# Patient Record
Sex: MALE | Race: WHITE | Employment: FULL TIME | URBAN - METROPOLITAN AREA
[De-identification: names, ages, dates, MRNs, and addresses within clinical notes are randomized per-mention and may not be internally consistent; named-entity substitution may affect disease eponyms.]

---

## 2018-02-27 ENCOUNTER — OFFICE VISIT (OUTPATIENT)
Dept: ORTHOPEDIC SURGERY | Age: 48
End: 2018-02-27

## 2018-02-27 VITALS
HEART RATE: 77 BPM | WEIGHT: 228 LBS | SYSTOLIC BLOOD PRESSURE: 148 MMHG | BODY MASS INDEX: 32.64 KG/M2 | DIASTOLIC BLOOD PRESSURE: 96 MMHG | HEIGHT: 70 IN

## 2018-02-27 DIAGNOSIS — M25.562 LEFT KNEE PAIN, UNSPECIFIED CHRONICITY: Primary | ICD-10-CM

## 2018-02-27 DIAGNOSIS — M22.42 CHONDROMALACIA PATELLAE OF LEFT KNEE: ICD-10-CM

## 2018-02-27 DIAGNOSIS — M65.9 SYNOVITIS OF LEFT KNEE: ICD-10-CM

## 2018-02-27 PROBLEM — M65.962 SYNOVITIS OF LEFT KNEE: Status: ACTIVE | Noted: 2018-02-27

## 2018-02-27 PROCEDURE — 99203 OFFICE O/P NEW LOW 30 MIN: CPT | Performed by: FAMILY MEDICINE

## 2018-02-27 RX ORDER — DICLOFENAC SODIUM 75 MG/1
75 TABLET, DELAYED RELEASE ORAL 2 TIMES DAILY
Qty: 60 TABLET | Refills: 3 | Status: SHIPPED | OUTPATIENT
Start: 2018-02-27 | End: 2020-09-24 | Stop reason: SDUPTHER

## 2018-02-27 NOTE — PROGRESS NOTES
painless range of motion with regards to flexion and extension. Strength is 5/5 thorough out all planes. Ligamentous stability is grossly intact. Examination of the bilateral hip reveals intact skin. The patient demonstrates full painless range of motion with regards to flexion, abduction, internal and external rotation. There is not tenderness about the greater trochanter. There is a negative straight leg raise against resistance. Strength is 5/5 thorough out all planes. Right Lower Extremity: Examination of the right lower extremity does not show any tenderness, deformity or injury. Range of motion is unremarkable. There is no gross instability. There are no rashes, ulcerations or lesions. Strength and tone are normal.  Left Lower Extremity: Examination of the left lower extremity does not show any tenderness, deformity or injury. Range of motion is unremarkable. There is no gross instability. There are no rashes, ulcerations or lesions. Strength and tone are normal.      Diagnostic Test Findings: Left knee AP and PA weight-bearing sunrise and lateral films are obtained today and they show mild medial compartment narrowing and mild patellofemoral arthropathy but no high-grade degenerative changes or obvious loose bodies identified. Assessment :  #1.  2 days status post testing injury with aggravation low-grade left posterior arthritis or chondromalacia with possible occult meniscus and the synovitis with effusion    Impression:  Encounter Diagnoses   Name Primary?     Left knee pain, unspecified chronicity Yes    Synovitis of left knee     Chondromalacia patellae of left knee        Office Procedures:  Orders Placed This Encounter   Procedures    XR KNEE LEFT (MIN 4 VIEWS)    MRI Knee Left WO Contrast     Scheduling Instructions:      [facility] [place] [address]      [phone]            AUTH #:      TIME AND DATE TBD PLEASE CALL PATIENT ONCE APPROVED TO SCHEDULE             Remember that it may take several business days to pre-cert your MRI through your insurance. Our office will contact you as soon as we have the approval.             We will not give any test results over the phone. Please call [phone] once you have your test day and time to schedule a follow up with Dr. Rehan Hicks. Order Specific Question:   Reason for exam:     Answer:   r/o medial meniscus tear. Evaluate OA/CMP       Treatment Plan:  Treatment options were discussed with Manuela Ellison. We did review his plain films and exam findings. He does have a little mild underlying knee degenerative changes per plain films. I'm concerned about his mechanism of injury and an occult medial meniscus tear. 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 We did start him on diclofenac 75 mg 1 pill twice daily. Hold off on steroid injections bracing and imaging pending the results of his MRI and clinic and see him back in the office later this week. Relative rest ice and elevation was also recommended. I will see him back post imaging. This dictation was performed with a verbal recognition program (DRAGON) and it was checked for errors. It is possible that there are still dictated errors within this office note. If so, please bring any errors to my attention for an addendum. All efforts were made to ensure that this office note is accurate.

## 2018-03-06 ENCOUNTER — OFFICE VISIT (OUTPATIENT)
Dept: ORTHOPEDIC SURGERY | Age: 48
End: 2018-03-06

## 2018-03-06 VITALS
DIASTOLIC BLOOD PRESSURE: 99 MMHG | HEART RATE: 75 BPM | SYSTOLIC BLOOD PRESSURE: 153 MMHG | HEIGHT: 70 IN | WEIGHT: 227.96 LBS | BODY MASS INDEX: 32.63 KG/M2

## 2018-03-06 DIAGNOSIS — S83.242A ACUTE MEDIAL MENISCUS TEAR OF LEFT KNEE, INITIAL ENCOUNTER: ICD-10-CM

## 2018-03-06 DIAGNOSIS — M17.12 PRIMARY OSTEOARTHRITIS OF LEFT KNEE: ICD-10-CM

## 2018-03-06 DIAGNOSIS — M25.562 ACUTE PAIN OF LEFT KNEE: ICD-10-CM

## 2018-03-06 DIAGNOSIS — M22.42 CHONDROMALACIA PATELLAE OF LEFT KNEE: Primary | ICD-10-CM

## 2018-03-06 PROCEDURE — MISCD110 LATERAL STABILIZER: Performed by: FAMILY MEDICINE

## 2018-03-06 PROCEDURE — 99213 OFFICE O/P EST LOW 20 MIN: CPT | Performed by: FAMILY MEDICINE

## 2018-03-06 NOTE — PROGRESS NOTES
Chief Complaint  Knee Pain (TR MRI LEFT KNEE)      Follow-up left knee pain with underlying medial compartment arthritis and mild patellofemoral arthropathy with possible occult meniscus review of imaging    History of Present Illness:  Alfredo Paige is a 49 y.o. 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 Arti Stevenson who is being seen today for evaluation of persistent medial left knee pain. He states that over the past 4-8 weeks he has had some episodic soreness to the anterior medial portion of his left knee which she attributed to wrapping more than 40 minutes All games in the past couple of months. His symptoms came to a head on 2/25/2018 after roughing 11 games this past weekend on the 2nd game on 2/25 he did twist awkwardly and sustained an injury to his left knee. He does not really recall a pop or crack but did have pain and difficulty with attempting to run and pivot. Following his games, he went to dinner and was sitting for about an hour and is a consult. More substantial 3-5 out of 10 pain medially about the knee. He has had difficulty with distance walking positional changes walking out of the surfaces and pivoting. Squatting is also painful. His symptoms appear to be more medial in nature although he has had some anterior knee soreness on the right. He is not really complaining of true locking or catching or pseudo-buckling. He did take 2 doses of Aleve and ice and has been using a home brace. He is not complaining of true locking or catching. At best his symptoms have improved only 15% over the last couple of days and he is being seen today for orthopedic and sports consultation with imaging. He was seen initially in the office on 2/27/2018 started on conservative treatment for his underlying left knee medial compartment osteoarthritis with patellofemoral arthropathy and occult meniscus.   He medial meniscus is not complaining of mechanical symptoms. Symmetrically is 85-90% improved. He would like to avoid arthroscopic intervention presently since umpiring starts later this week. He will continue with his diclofenac 75 mg 1 pill placed daily was started him on a home-based exercise program.  Given his improvement we held off on steroid injections for the time being. He may utilize his patellar stabilizing brace. Potential for physical supplementation was also discussed and we'll consider this in the next couple of weeks depending on his symptomatology. He will contact us in the interim with questions and concerns of icing and activity modification was discussed. This dictation was performed with a verbal recognition program (DRAGON) and it was checked for errors. It is possible that there are still dictated errors within this office note. If so, please bring any errors to my attention for an addendum. All efforts were made to ensure that this office note is accurate.

## 2018-03-09 ENCOUNTER — TELEPHONE (OUTPATIENT)
Dept: ORTHOPEDIC SURGERY | Age: 48
End: 2018-03-09

## 2018-04-10 ENCOUNTER — OFFICE VISIT (OUTPATIENT)
Dept: ORTHOPEDIC SURGERY | Age: 48
End: 2018-04-10

## 2018-04-10 VITALS — HEIGHT: 70 IN | WEIGHT: 227.96 LBS | BODY MASS INDEX: 32.63 KG/M2

## 2018-04-10 DIAGNOSIS — M22.42 CHONDROMALACIA PATELLAE OF LEFT KNEE: ICD-10-CM

## 2018-04-10 DIAGNOSIS — M25.562 ACUTE PAIN OF LEFT KNEE: ICD-10-CM

## 2018-04-10 DIAGNOSIS — M17.12 PRIMARY OSTEOARTHRITIS OF LEFT KNEE: Primary | ICD-10-CM

## 2018-04-10 PROCEDURE — 20610 DRAIN/INJ JOINT/BURSA W/O US: CPT | Performed by: FAMILY MEDICINE

## 2018-04-10 PROCEDURE — 99213 OFFICE O/P EST LOW 20 MIN: CPT | Performed by: FAMILY MEDICINE

## 2018-04-17 ENCOUNTER — OFFICE VISIT (OUTPATIENT)
Dept: ORTHOPEDIC SURGERY | Age: 48
End: 2018-04-17

## 2018-04-17 VITALS — WEIGHT: 227.96 LBS | BODY MASS INDEX: 32.63 KG/M2 | HEIGHT: 70 IN

## 2018-04-17 DIAGNOSIS — M25.562 ACUTE PAIN OF LEFT KNEE: ICD-10-CM

## 2018-04-17 DIAGNOSIS — M22.42 CHONDROMALACIA PATELLAE OF LEFT KNEE: ICD-10-CM

## 2018-04-17 DIAGNOSIS — M17.12 PRIMARY OSTEOARTHRITIS OF LEFT KNEE: Primary | ICD-10-CM

## 2018-04-17 PROCEDURE — 20610 DRAIN/INJ JOINT/BURSA W/O US: CPT | Performed by: FAMILY MEDICINE

## 2018-04-26 ENCOUNTER — OFFICE VISIT (OUTPATIENT)
Dept: ORTHOPEDIC SURGERY | Age: 48
End: 2018-04-26

## 2018-04-26 VITALS
HEART RATE: 97 BPM | DIASTOLIC BLOOD PRESSURE: 90 MMHG | WEIGHT: 227 LBS | SYSTOLIC BLOOD PRESSURE: 149 MMHG | HEIGHT: 70 IN | BODY MASS INDEX: 32.5 KG/M2

## 2018-04-26 DIAGNOSIS — S83.242A ACUTE MEDIAL MENISCUS TEAR OF LEFT KNEE, INITIAL ENCOUNTER: ICD-10-CM

## 2018-04-26 DIAGNOSIS — M22.42 CHONDROMALACIA PATELLAE OF LEFT KNEE: ICD-10-CM

## 2018-04-26 DIAGNOSIS — M25.562 ACUTE PAIN OF LEFT KNEE: ICD-10-CM

## 2018-04-26 DIAGNOSIS — M17.12 PRIMARY OSTEOARTHRITIS OF LEFT KNEE: Primary | ICD-10-CM

## 2018-04-26 PROCEDURE — 20610 DRAIN/INJ JOINT/BURSA W/O US: CPT | Performed by: FAMILY MEDICINE

## 2018-06-26 ENCOUNTER — OFFICE VISIT (OUTPATIENT)
Dept: ORTHOPEDIC SURGERY | Age: 48
End: 2018-06-26

## 2018-06-26 VITALS — HEIGHT: 70 IN | WEIGHT: 227 LBS | BODY MASS INDEX: 32.5 KG/M2

## 2018-06-26 DIAGNOSIS — M17.12 PRIMARY OSTEOARTHRITIS OF LEFT KNEE: ICD-10-CM

## 2018-06-26 DIAGNOSIS — M22.42 CHONDROMALACIA PATELLAE OF LEFT KNEE: Primary | ICD-10-CM

## 2018-06-26 DIAGNOSIS — M25.562 ACUTE PAIN OF LEFT KNEE: ICD-10-CM

## 2018-06-26 PROCEDURE — 99213 OFFICE O/P EST LOW 20 MIN: CPT | Performed by: FAMILY MEDICINE

## 2018-12-13 ENCOUNTER — OFFICE VISIT (OUTPATIENT)
Dept: ORTHOPEDIC SURGERY | Age: 48
End: 2018-12-13
Payer: COMMERCIAL

## 2018-12-13 VITALS — WEIGHT: 230 LBS | HEIGHT: 71 IN | BODY MASS INDEX: 32.2 KG/M2

## 2018-12-13 DIAGNOSIS — M54.5 LOW BACK PAIN, UNSPECIFIED BACK PAIN LATERALITY, UNSPECIFIED CHRONICITY, WITH SCIATICA PRESENCE UNSPECIFIED: Primary | ICD-10-CM

## 2018-12-13 DIAGNOSIS — M51.36 DDD (DEGENERATIVE DISC DISEASE), LUMBAR: ICD-10-CM

## 2018-12-13 DIAGNOSIS — M54.17 LEFT LUMBOSACRAL RADICULOPATHY: ICD-10-CM

## 2018-12-13 DIAGNOSIS — M43.17 SPONDYLOLISTHESIS OF LUMBOSACRAL REGION: ICD-10-CM

## 2018-12-13 PROBLEM — M54.50 LOW BACK PAIN: Status: ACTIVE | Noted: 2018-12-13

## 2018-12-13 PROBLEM — M51.369 DDD (DEGENERATIVE DISC DISEASE), LUMBAR: Status: ACTIVE | Noted: 2018-12-13

## 2018-12-13 PROCEDURE — L0625 LO FLEX L1-BELOW L5 PRE OTS: HCPCS | Performed by: FAMILY MEDICINE

## 2018-12-13 PROCEDURE — 99214 OFFICE O/P EST MOD 30 MIN: CPT | Performed by: FAMILY MEDICINE

## 2018-12-13 RX ORDER — CYCLOBENZAPRINE HCL 10 MG
10 TABLET ORAL 3 TIMES DAILY PRN
Qty: 30 TABLET | Refills: 0 | Status: SHIPPED | OUTPATIENT
Start: 2018-12-13 | End: 2018-12-23

## 2018-12-13 RX ORDER — METHYLPREDNISOLONE 4 MG/1
TABLET ORAL
Qty: 21 KIT | Refills: 0 | Status: SHIPPED | OUTPATIENT
Start: 2018-12-13 | End: 2018-12-27

## 2018-12-13 RX ORDER — DICLOFENAC SODIUM 75 MG/1
75 TABLET, DELAYED RELEASE ORAL 2 TIMES DAILY
Qty: 60 TABLET | Refills: 3 | Status: SHIPPED | OUTPATIENT
Start: 2018-12-13 | End: 2018-12-27 | Stop reason: SDUPTHER

## 2018-12-13 NOTE — PROGRESS NOTES
about the greater trochanter. There is a negative straight leg raise against resistance. Strength is 5/5 thorough out all planes. Right Lower Extremity: Examination of the right lower extremity does not show any tenderness, deformity or injury. Range of motion is unremarkable. There is no gross instability. There are no rashes, ulcerations or lesions. Strength and tone are normal.  Left Lower Extremity: Examination of the left lower extremity does not show any tenderness, deformity or injury. Range of motion is unremarkable. There is no gross instability. There are no rashes, ulcerations or lesions. Strength and tone are normal.      Diagnostic Test Findings:  AP and lateral lumbar spine films were obtained today do show multilevel degenerative disc changes with mild endplate spurring. He does appear to have some facet arthropathy and a grade 1 spondylolisthesis at L5-S1      Assessment:  #1.  2-3 weeks status post symptomatic aggravation mechanical low back pain with lumbar degenerative disc disease and grade 1 L5-S1 spondylolisthesis with likely left S1 radiculopathy    Impression:  Encounter Diagnoses   Name Primary?  Low back pain, unspecified back pain laterality, unspecified chronicity, with sciatica presence unspecified Yes    Spondylolisthesis of lumbosacral region     DDD (degenerative disc disease), lumbar     Left lumbosacral radiculopathy        Office Procedures:  Orders Placed This Encounter   Procedures    XR LUMBAR SPINE (2-3 VIEWS)     Order Specific Question:   Reason for exam:     Answer:   pain    MRI Lumbar Spine WO Contrast     Scheduling Instructions:      [facility] [place] [address]      [phone]            AUTH #:      TIME AND DATE TBD      PLEASE CALL PATIENT ONCE APPROVED TO SCHEDULE             Remember that it may take several business days to pre-cert your MRI through your insurance.  Our office will

## 2018-12-14 ENCOUNTER — TELEPHONE (OUTPATIENT)
Dept: ORTHOPEDIC SURGERY | Age: 48
End: 2018-12-14

## 2018-12-27 ENCOUNTER — OFFICE VISIT (OUTPATIENT)
Dept: ORTHOPEDIC SURGERY | Age: 48
End: 2018-12-27
Payer: COMMERCIAL

## 2018-12-27 VITALS
HEIGHT: 71 IN | DIASTOLIC BLOOD PRESSURE: 97 MMHG | WEIGHT: 229.94 LBS | HEART RATE: 83 BPM | BODY MASS INDEX: 32.19 KG/M2 | SYSTOLIC BLOOD PRESSURE: 131 MMHG

## 2018-12-27 DIAGNOSIS — M54.16 LUMBAR RADICULOPATHY: ICD-10-CM

## 2018-12-27 DIAGNOSIS — M51.26 HERNIATED NUCLEUS PULPOSUS, LUMBAR: Primary | ICD-10-CM

## 2018-12-27 DIAGNOSIS — M43.17 SPONDYLOLISTHESIS OF LUMBOSACRAL REGION: ICD-10-CM

## 2018-12-27 PROCEDURE — 99213 OFFICE O/P EST LOW 20 MIN: CPT | Performed by: FAMILY MEDICINE

## 2018-12-27 NOTE — PROGRESS NOTES
Chief Complaint  Back Pain (F/u lumbar MRI)      Follow-up left lower back gluteal and leg pain with lumbar spondylolisthesis and probable left lumbar radiculopathy. Review of lumbar imaging    History of Present Illness:  Evita Mancilla is a 51 y.o. 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 Ananda Johnston who is being seen today for evaluation of persistent symptomatic left lower back gluteal and leg pain. He states it he's been having pain symptoms since the end of November 2018 which started insidiously in his lower back and has involved his gluteal region and posteriorly his left thigh in the S1 distribution. He is also having some numbness and tingling into his left foot. 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 He does complain of a dog cramping achy pain at 3-4-10 to have more sharp 5-6 out of 10 pain in his gluteal region and posterior thigh with standing and walking and positional change. Sitting is also problematic for him. He does have some episodic pain at night. He has had 5 sessions of chiropractic care out in [place] with Dr. Stefano Chapman was ineffective and did get some temporary benefit from traction but his symptoms never really went away. He has been taking Aleve and started back on his anti-inflammatories which we had him on earlier this year. He denies neurogenic bowel or bladder symptoms. He does have history of mechanical back pain but is never had formal MRI imaging. He has not had dedicated rehab and is being seen today for orthopedic and sports consultation with imaging. He was seen initially in the office on 12/13/2018 and was sent for an MRI given his ongoing pain and exam findings.   His MRI is obtained [facility] on 19 2018 and did show evidence of multilevel disc Examination  Inspection:  There is no high-grade deformity although he is very stiff. There is no palpable spasm. Palpation:  He does have Much less clinical tenderness over lower lumbar facets and paraspinals left Side with now only minimal central gluteal discomfort. No visual tuberosity tenderness. Rang of Motion:  He is only able to forward flex about 50-60 and has much less painful extension with less left sided gluteal discomfort with extension to the left. Lateral bending rotation diminished at least 30 %. Strength: There does not appear to be any focal motor deficits. Special Tests:  He does appear to have a mallet negative straight leg raise on the left. Negative logroll testing. IT band and hamstrings are very tight currently. Negative hip impingement testing. Negative Morales Charles testing. Skin: There are no rashes, ulcerations or lesions. Distal motor sensory and vascular exams intact. Gait: Fluid smooth gait    Reflexes:  Symmetrically preserved     Additional Comments:     Additional Examinations:  Contralateral Exam: Examination of the right hip reveals intact skin. The patient demonstrates full painless range of motion with regards to flexion, abduction, internal and external rotation. There is not tenderness about the greater trochanter. There is a negative straight leg raise against resistance. Strength is 5/5 thorough out all planes. Right Lower Extremity: Examination of the right lower extremity does not show any tenderness, deformity or injury. Range of motion is unremarkable. There is no gross instability. There are no rashes, ulcerations or lesions. Strength and tone are normal.  Left Lower Extremity: Examination of the left lower extremity does not show any tenderness, deformity or injury. Range of motion is unremarkable. There is no gross instability. There are no rashes, ulcerations or lesions.   Strength and tone are

## 2019-01-03 ENCOUNTER — HOSPITAL ENCOUNTER (OUTPATIENT)
Dept: PHYSICAL THERAPY | Age: 49
Setting detail: THERAPIES SERIES
Discharge: HOME | End: 2019-01-03
Payer: COMMERCIAL

## 2019-01-03 PROCEDURE — G8979 MOBILITY GOAL STATUS: HCPCS

## 2019-01-03 PROCEDURE — 97161 PT EVAL LOW COMPLEX 20 MIN: CPT

## 2019-01-03 PROCEDURE — G8978 MOBILITY CURRENT STATUS: HCPCS

## 2019-01-03 PROCEDURE — 97110 THERAPEUTIC EXERCISES: CPT

## 2019-01-03 ASSESSMENT — PAIN DESCRIPTION - LOCATION: LOCATION: BACK

## 2019-01-03 ASSESSMENT — PAIN DESCRIPTION - ORIENTATION: ORIENTATION: LOWER

## 2019-01-03 ASSESSMENT — PAIN DESCRIPTION - PROGRESSION: CLINICAL_PROGRESSION: GRADUALLY IMPROVING

## 2019-01-03 ASSESSMENT — PAIN DESCRIPTION - DESCRIPTORS: DESCRIPTORS: BURNING;SHOOTING

## 2019-01-08 ENCOUNTER — HOSPITAL ENCOUNTER (OUTPATIENT)
Dept: PHYSICAL THERAPY | Age: 49
Setting detail: THERAPIES SERIES
Discharge: HOME | End: 2019-01-08
Payer: COMMERCIAL

## 2019-01-08 PROCEDURE — 97110 THERAPEUTIC EXERCISES: CPT

## 2019-01-09 ENCOUNTER — APPOINTMENT (OUTPATIENT)
Dept: PHYSICAL THERAPY | Age: 49
End: 2019-01-09
Payer: COMMERCIAL

## 2019-01-15 ENCOUNTER — APPOINTMENT (OUTPATIENT)
Dept: PHYSICAL THERAPY | Age: 49
End: 2019-01-15
Payer: COMMERCIAL

## 2019-01-17 ENCOUNTER — HOSPITAL ENCOUNTER (OUTPATIENT)
Dept: PHYSICAL THERAPY | Age: 49
Setting detail: THERAPIES SERIES
Discharge: HOME | End: 2019-01-17
Payer: COMMERCIAL

## 2019-01-17 PROCEDURE — 97110 THERAPEUTIC EXERCISES: CPT

## 2019-01-17 ASSESSMENT — PAIN SCALES - QUEBEC BACK PAIN DISABILITY SCALE
QUEBEC CMS MODIFIER: CI
RUN ONE BLOCK OR 100M: 0
CARRY TWO BAGS OF GROCERIES: 0
MOVE A CHAIR: 0
GET OUT OF BED: 0
PULL OR PUSH HEAVY DOORS: 0
BEND OVER TO CLEAN THE BATHTUB: 0
REACH UP TO HIGH SHELVES: 0
LIFT AND CARRY A HEAVY SUITCASE: 0
WALK A FEW BLOCKS OR 300 TO 400M: 0
PUT ON SOCKS OR PANYHOSE: 0
TURN OVER IN BED: 0
MAKE YOUR BED: 0
STAND UP FOR 20 TO 30 MINUTES: 0
CLIMB ONE FLIGHT OF STAIRS: 0
QUEBEC DISABILITY INDEX: 1-19%
SIT IN A CHAIR FOR SEVERAL HOURS: 1
WALK SEVERAL KILOMETERS  OR MILES: 0
SLEEP THROUGH THE NIGHT: 0
THROW A BALL: 0
TAKE FOOD OUT OF THE REFRIGERATOR: 0
RIDE IN A CAR: 1
TOTAL SCORE: 2

## 2019-01-22 ENCOUNTER — APPOINTMENT (OUTPATIENT)
Dept: PHYSICAL THERAPY | Age: 49
End: 2019-01-22
Payer: COMMERCIAL

## 2019-01-24 ENCOUNTER — OFFICE VISIT (OUTPATIENT)
Dept: ORTHOPEDIC SURGERY | Age: 49
End: 2019-01-24
Payer: COMMERCIAL

## 2019-01-24 ENCOUNTER — APPOINTMENT (OUTPATIENT)
Dept: PHYSICAL THERAPY | Age: 49
End: 2019-01-24
Payer: COMMERCIAL

## 2019-01-24 VITALS
HEART RATE: 86 BPM | SYSTOLIC BLOOD PRESSURE: 163 MMHG | DIASTOLIC BLOOD PRESSURE: 108 MMHG | WEIGHT: 235 LBS | BODY MASS INDEX: 33.64 KG/M2 | HEIGHT: 70 IN

## 2019-01-24 DIAGNOSIS — M54.5 LOW BACK PAIN, UNSPECIFIED BACK PAIN LATERALITY, UNSPECIFIED CHRONICITY, WITH SCIATICA PRESENCE UNSPECIFIED: ICD-10-CM

## 2019-01-24 DIAGNOSIS — M22.42 CHONDROMALACIA PATELLAE OF LEFT KNEE: ICD-10-CM

## 2019-01-24 DIAGNOSIS — M51.36 DDD (DEGENERATIVE DISC DISEASE), LUMBAR: ICD-10-CM

## 2019-01-24 DIAGNOSIS — M43.17 SPONDYLOLISTHESIS OF LUMBOSACRAL REGION: ICD-10-CM

## 2019-01-24 DIAGNOSIS — M54.16 LUMBAR RADICULOPATHY: ICD-10-CM

## 2019-01-24 DIAGNOSIS — M17.12 PRIMARY OSTEOARTHRITIS OF LEFT KNEE: ICD-10-CM

## 2019-01-24 DIAGNOSIS — M51.26 HERNIATED NUCLEUS PULPOSUS, LUMBAR: Primary | ICD-10-CM

## 2019-01-24 PROCEDURE — 99213 OFFICE O/P EST LOW 20 MIN: CPT | Performed by: FAMILY MEDICINE

## 2019-01-29 ENCOUNTER — APPOINTMENT (OUTPATIENT)
Dept: PHYSICAL THERAPY | Age: 49
End: 2019-01-29
Payer: COMMERCIAL

## 2019-03-12 ENCOUNTER — OFFICE VISIT (OUTPATIENT)
Dept: ORTHOPEDIC SURGERY | Age: 49
End: 2019-03-12
Payer: COMMERCIAL

## 2019-03-12 VITALS — BODY MASS INDEX: 33.64 KG/M2 | HEIGHT: 70 IN | WEIGHT: 235.01 LBS

## 2019-03-12 DIAGNOSIS — M21.42 PES PLANUS OF BOTH FEET: ICD-10-CM

## 2019-03-12 DIAGNOSIS — M79.671 RIGHT FOOT PAIN: ICD-10-CM

## 2019-03-12 DIAGNOSIS — M72.2 PLANTAR FASCIITIS OF RIGHT FOOT: Primary | ICD-10-CM

## 2019-03-12 DIAGNOSIS — M21.41 PES PLANUS OF BOTH FEET: ICD-10-CM

## 2019-03-12 PROCEDURE — 99214 OFFICE O/P EST MOD 30 MIN: CPT | Performed by: FAMILY MEDICINE

## 2019-03-12 PROCEDURE — L3040 FT ARCH SUPRT PREMOLD LONGIT: HCPCS | Performed by: FAMILY MEDICINE

## 2019-03-12 RX ORDER — DICLOFENAC SODIUM 75 MG/1
75 TABLET, DELAYED RELEASE ORAL 2 TIMES DAILY
Qty: 60 TABLET | Refills: 3 | Status: SHIPPED | OUTPATIENT
Start: 2019-03-12 | End: 2020-09-24 | Stop reason: SDUPTHER

## 2019-03-12 RX ORDER — METHYLPREDNISOLONE 4 MG/1
TABLET ORAL
Qty: 21 KIT | Refills: 0 | Status: SHIPPED | OUTPATIENT
Start: 2019-03-12 | End: 2019-04-11 | Stop reason: ALTCHOICE

## 2019-04-11 ENCOUNTER — OFFICE VISIT (OUTPATIENT)
Dept: ORTHOPEDIC SURGERY | Age: 49
End: 2019-04-11
Payer: COMMERCIAL

## 2019-04-11 VITALS
HEIGHT: 70 IN | SYSTOLIC BLOOD PRESSURE: 143 MMHG | HEART RATE: 89 BPM | DIASTOLIC BLOOD PRESSURE: 91 MMHG | BODY MASS INDEX: 32.21 KG/M2 | WEIGHT: 225 LBS

## 2019-04-11 DIAGNOSIS — M79.671 RIGHT FOOT PAIN: Primary | ICD-10-CM

## 2019-04-11 DIAGNOSIS — M21.42 PES PLANUS OF BOTH FEET: ICD-10-CM

## 2019-04-11 DIAGNOSIS — M72.2 PLANTAR FASCIITIS OF RIGHT FOOT: ICD-10-CM

## 2019-04-11 DIAGNOSIS — M21.41 PES PLANUS OF BOTH FEET: ICD-10-CM

## 2019-04-11 PROBLEM — M21.40 FLAT FOOT: Status: ACTIVE | Noted: 2019-04-11

## 2019-04-11 PROCEDURE — 99213 OFFICE O/P EST LOW 20 MIN: CPT | Performed by: FAMILY MEDICINE

## 2019-04-11 NOTE — PROGRESS NOTES
Chief Complaint  Foot Pain (Right heel)    Follow-up right foot pain with plantar fasciitis and inflexibility with history of mechanical low back pain and disc herniation with resolved left L5 radiculopathy      History of Present Illness:  Caryle Deer is a 51 y.o. 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 Kathy Holland whom we have seen in the past for his knees and was treated several months ago for a lumbar disc herniation with radiculopathy which is improved who is being seen today upon self-referral for evaluation of a new condition to his right heel. He states that about 4 weeks ago when he was refereeing basketball, he had 2 games and went into overtime and as he was running down the court he felt soreness to the plantar portion of his heel and midfoot on his right foot. There is no actual trauma or injury prior to becoming symptomatic. He did have tightness and stiffness. He did initially attempt to stretch and ice and has gotten some off-the-shelf orthotics but continues to have difficulty with substantial morning awakening as well as distance walking. He localizes the majority of the pain over the midportion of the plantar fascia and also over the medial calcaneal tubercle. He is having difficulty getting up from a seated position after a prolonged period and initiation of gait. He has been taking low doses of over-the-counter ibuprofen and has gotten some off-the-shelf inserts. His pain does come in 3-7 out of 10 limiting his ability to walk and exercise as well as referee. 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 Denies numbness tingling or further radicular symptoms. Knees are doing reasonably well this time. He is being seen today for orthopedic and sports consultation with imaging.     He was initially seen in the office on 3/12/2019 tablet diagnosed with right plantar fasciitis with inflexibility and planus. He is in the process of breaking in his orthotics and start him on a Medrol Dosepak and encouraged to continue with his diclofenac 75 mg 1 pill twice daily. He presents back today stating that he is doing much better. Symmetrically he is 80% improved and is gotten substantial benefit from his off-the-shelf inserts. He is very busy at work but also with umpiring most nights per week as we are in the midst of baseball season and he has not yet gotten into physical therapy but does want to pursue this. He has been diligently working on his stretching. He is having much less discomfort at night and maximal pain is only about a 3 out of 10 and is having less pain with positional changes after sitting. He is pleased with this initial progress. Medical History  Patient's medications, allergies, past medical, surgical, social and family histories were reviewed and updated as appropriate. Review of Systems  Pertinent items are noted in HPI  Review of systems reviewed from Patient History Form dated on 12/13/2018 and available in the patient's chart under the Media tab. Vital Signs  Vitals:    04/11/19 1411   BP: (!) 143/91   Pulse: 89       General Exam:     Constitutional: Patient is adequately groomed with no evidence of malnutrition  DTRs: Deep tendon reflexes are intact  Mental Status: The patient is oriented to time, place and person. The patient's mood and affect are appropriate. Lymphatic: The lymphatic examination bilaterally reveals all areas to be without enlargement or induration. Vascular: Examination reveals no swelling or calf tenderness. Peripheral pulses are palpable and 2+. Neurological: The patient has good coordination. There is no weakness or sensory deficit. Right foot examination  Inspection:  There is no high-grade deformity although he is still fairly stiff. There is no palpable defects to the plantar fascia.       Palpation:  He does have less prominent clinical tenderness over the midsubstance of the plantar fascia and also the medial calcaneal tubercle. Rang of Motion:  He is still somewhat tight regard to his plantar fascia and Achilles tendon. Strength: There does not appear to be any focal motor deficits. Strength testing about the ankle mid and forefoot is intact. Special Tests:  Negative special testing. Negative [place]'s testing. Skin: There are no rashes, ulcerations or lesions. Distal motor sensory and vascular exams intact. Gait: Less pain with walking and positional change. He is an overpronator. Reflexes:  Symmetrically preserved     Additional Comments:     Additional Examinations:  Contralateral Exam: Contralateral left foot exam is benign. Right Lower Extremity: Examination of the right lower extremity does not show any tenderness, deformity or injury. Range of motion is unremarkable. There is no gross instability. There are no rashes, ulcerations or lesions. Strength and tone are normal.  Left Lower Extremity: Examination of the left lower extremity does not show any tenderness, deformity or injury. Range of motion is unremarkable. There is no gross instability. There are no rashes, ulcerations or lesions. Strength and tone are normal.      Diagnostic Test Findings:  Right foot AP lateral and oblique films are obtained today and do show inferior calcaneal spurring without evidence of acute fracture. Assessment:  #1.  8 weeks status post substantially improved right foot and heel pain with suspected plantar fasciitis with inflexibility. #2.  Pes planus with pronation. #3.  History of markedly improved aggravation mechanical low back pain with MRI documented multilevel lumbar degenerative disc disease with L5-S1 disc herniation and about and left L5 nerve root with facet arthropathy and resolved left L5 radiculopathy    Impression:  Encounter Diagnoses   Name Primary?     Right foot pain Yes    Plantar fasciitis of right foot     Pes planus of both feet        Office Procedures:  No orders of the defined types were placed in this encounter. Treatment Plan:  Treatment options were discussed with Caryle Deer. We did once again review his plain films and exam findings. Symmetrically he is feeling better would rate his improvement about 80% overlap month. He is having much less pain awakening in the morning and after prolonged sitting. I strongly encouraged him to attend at least a few sessions of physical therapy as I do believe he would benefit from [place] and instruction on proper stretching program.  He will continue with his off-the-shelf inserts that I would recommend continuing with his diclofenac 75 mg 1 twice daily. I think he will improve as he gets in the therapy that we can see him back as needed. Icing and activity modification cross friction massage was recommended. See him back as needed. He was encouraged to contact us with questions or concerns. This dictation was performed with a verbal recognition program (DRAGON) and it was checked for errors. It is possible that there are still dictated errors within this office note. If so, please bring any errors to my attention for an addendum. All efforts were made to ensure that this office note is accurate.

## 2019-04-22 ENCOUNTER — HOSPITAL ENCOUNTER (OUTPATIENT)
Dept: PHYSICAL THERAPY | Age: 49
Setting detail: THERAPIES SERIES
Discharge: HOME | End: 2019-04-22
Payer: COMMERCIAL

## 2019-04-22 PROCEDURE — 97110 THERAPEUTIC EXERCISES: CPT

## 2019-04-22 PROCEDURE — 97035 APP MDLTY 1+ULTRASOUND EA 15: CPT

## 2019-04-22 PROCEDURE — 97161 PT EVAL LOW COMPLEX 20 MIN: CPT

## 2019-04-22 PROCEDURE — 97140 MANUAL THERAPY 1/> REGIONS: CPT

## 2019-04-22 NOTE — PROGRESS NOTES
Outpatient Physical Therapy  [] [facility]    Phone: [phone]   Fax: [phone]   [] [facility]  Phone: [phone]              Fax: [phone]  [x] [facility]   Phone: [phone]   Fax: [phone]     To: Referring Practitioner: Dr. Alphonsa Curling      Patient: Manuel Draper   : 1970   MRN: [id number]  Evaluation Date: 2019      Diagnosis Information:  · Diagnosis: R foot pain (M79.671); plantar fasciitis of R foot (M72.2); Pes planus of both feet (M21.41, M21.42)   · Treatment Diagnosis: Decreased functional mobility     Physical Therapy Certification/Re-Certification Form  Dear Dr. Jd Emmanuel,  The following patient has been evaluated for physical therapy services and for therapy to continue, Medicare requires monthly physician review of the treatment plan. Please review the attached evaluation and/or summary of the patient's plan of care, and verify that you agree therapy should continue by signing the attached document and sending it back to our office. Plan of Care/Treatment to date:  [x] Therapeutic Exercise    [x] Modalities:  [] Therapeutic Activity     [] Ultrasound  [] Electrical Stimulation  [] Gait Training      [] Cervical Traction [] Lumbar Traction  [] Neuromuscular Re-education    [] Cold/hotpack [] Iontophoresis   [x] Instruction in HEP     Other:  [x] Manual Therapy      []             [] Aquatic Therapy      []           ? Frequency/Duration:  # Days per week: [x] 1 day # Weeks: [] 1 week [] 5 weeks     [x] 2 days? [] 2 weeks [] 6 weeks     [] 3 days   [] 3 weeks [] 7 weeks     [] 4 days   [x] 4 weeks [] 8 weeks    Rehab Potential: [] Excellent [] Good [] Fair  [] Poor       Electronically signed by:  Adilia Rivera PT      If you have any questions or concerns, please don't hesitate to call.   Thank you for your referral.      Physician Signature:________________________________Date:__________________  By signing above, therapists plan is approved by physician

## 2019-04-22 NOTE — FLOWSHEET NOTE
Physical Therapy Daily Treatment Note  Date:  2019    Patient Name:  Anais Isaacs    :  1970  MRN: [id number]  Restrictions/Precautions:    Pertinent Medical History:  Medical/Treatment Diagnosis Information:  · Diagnosis: R foot pain (M79.671); plantar fasciitis of R foot (M72.2); Pes planus of both feet (M21.41, M21.42)  · Treatment Diagnosis: Decreased functional mobility  Insurance/Certification information:  PT Insurance Information: [employer]as[employer]  Physician Information:  Referring Practitioner: Dr. Celi Ruggiero of care signed (Y/N):  Sent to in basket on 19  Visit# / total visits:    Pain level: /10     Progress Note: []  Yes  []  No  Next due by: Visit #10      History of Injury:Pt stated he started to have R foot pain on 19. Pt had on/off pain since 19. 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 Pt saw Dr. Corinne Srinivasan on 3/10/19. Pt was given a steroid pack and antiinflammatory and instructed to begin PT. Pt started PT late because \"baseball season started. \"  Pt rated R foot pain 0-3/10, but could spike after an activity (officiating) up to 5/10. Pt was instructed in a couple stretches previously. At home pt has pain with grass cutting or weed eating and stair climbing. 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 Pt wears \"heavier shoes\" as \"the whole shoe is just a big piece of armour- they're not spikes. \"   Pt has been wearing inserts for his \"flat arches\" which helps with pain. Pt has early morning foot stiffness. Subjective:       Objective:  Observation:   Test measurements:  19  Posture: L LE ER, otherwise WFL. Gait: L LE ER. Stair climbing: L LE ER, otherwise WFL. Heel walk: (-); toe walk (-).     TTP: R plantar fascia  AROM RLE (degrees)  R Ankle Dorsiflexion 0-20: (Gastrocs) R 2, L 8.  R Ankle Forefoot Inversion 0-40: 40, L 35  R Ankle Forefoot

## 2019-04-24 ENCOUNTER — HOSPITAL ENCOUNTER (OUTPATIENT)
Dept: PHYSICAL THERAPY | Age: 49
Setting detail: THERAPIES SERIES
Discharge: HOME | End: 2019-04-24
Payer: COMMERCIAL

## 2019-04-24 NOTE — FLOWSHEET NOTE
Physical Therapy  Cancellation/No-show Note  Patient Name:  Sukhdev Fung  :  1970   Date:  2019  Cancelled visits to date: 1  No-shows to date: 0    For today's appointment patient:  [x]  Cancelled  []  Rescheduled appointment  []  No-show     Reason given by patient:  []  Patient ill  []  Conflicting appointment  []  No transportation    []  Conflict with work  []  No reason given  []  Other:     Comments:  Work related    Electronically signed by:  Luis Alfredo Barnett PT

## 2019-04-26 ENCOUNTER — HOSPITAL ENCOUNTER (OUTPATIENT)
Dept: PHYSICAL THERAPY | Age: 49
Setting detail: THERAPIES SERIES
Discharge: HOME | End: 2019-04-26
Payer: COMMERCIAL

## 2019-04-26 PROCEDURE — 97140 MANUAL THERAPY 1/> REGIONS: CPT

## 2019-04-26 PROCEDURE — 97035 APP MDLTY 1+ULTRASOUND EA 15: CPT

## 2019-04-26 PROCEDURE — 97110 THERAPEUTIC EXERCISES: CPT

## 2019-04-26 NOTE — FLOWSHEET NOTE
Physical Therapy Daily Treatment Note  Date:  2019    Patient Name:  Florian Velez    :  1970  MRN: [id number]  Restrictions/Precautions:    Pertinent Medical History:  Medical/Treatment Diagnosis Information:  · Diagnosis: R foot pain (M79.671); plantar fasciitis of R foot (M72.2); Pes planus of both feet (M21.41, M21.42)  · Treatment Diagnosis: Decreased functional mobility  Insurance/Certification information:  PT Insurance Information: [employer] Easley  Physician Information:  Referring Practitioner: Dr. Agustin Veliz of care signed (Y/N):  Sent to in basket on 19  Visit# / total visits:    Pain level: /10     Progress Note: []  Yes  []  No  Next due by: Visit #10      History of Injury:Pt stated he started to have R foot pain on 19. Pt had on/off pain since 19. 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 Pt saw Dr. Hilda Landa on 3/10/19. Pt was given a steroid pack and antiinflammatory and instructed to begin PT. Pt started PT late because \"baseball season started. \"  Pt rated R foot pain 0-3/10, but could spike after an activity (officiating) up to 5/10. Pt was instructed in a couple stretches previously. At home pt has pain with grass cutting or weed eating and stair climbing. 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 Pt wears \"heavier shoes\" as \"the whole shoe is just a big piece of armour- they're not spikes. \"   Pt has been wearing inserts for his \"flat arches\" which helps with pain. Pt has early morning foot stiffness. Subjective:       Objective:  Observation:   Test measurements:  19  Posture: L LE ER, otherwise WFL. Gait: L LE ER. Stair climbing: L LE ER, otherwise WFL. Heel walk: (-); toe walk (-).     TTP: R plantar fascia  AROM RLE (degrees)  R Ankle Dorsiflexion 0-20: (Gastrocs) R 2, L 8.  R Ankle Forefoot Inversion 0-40: 40, L 35  R Ankle Forefoot Eversion 0-20: R 12, L 27  Joint Mobility: ROM RLE: Calcaneus PROM INV R 20, L 25; EV R 2, L 8.  Strength RLE: WFL  Strength LLE: WFL      Exercises:  Exercise/Equipment Resistance/Repetitions Other comments   Gastroc incline stretch 2x30\"    Standing HS stretch 2x30\"    BAPS R LE  PF/DF  INV/EV  CW/CCW 20x each    BOSU- dome up  Unilateral balance   10\" hold, 10x                                                         Other Therapeutic Activities:      Home Exercise Program:    4/22/19 HEP was instructed and included belt gastroc stretch, manual plantar fascia stretch, gastroc step stretch; towel toe curls. Pt was given written hand outs and demonstrated exercises correctly. Pt expressed understanding of exercises.       Manual Treatments:  DTM R plantar fascia, R calcaneal INV mob, 20' total    Modalities:  US 1.5 w/cm2, 100%, 8'    Timed Code Treatment Minutes:  42    Total Treatment Minutes:  42    Assessment  [] Patient tolerated treatment well [] Patient limited by fatigue  [x] Patient limited by pain  [] Patient limited by other medical complications  [] Other:         Prognosis: [x] Good [] Fair  [] Poor    Patient Requires Follow-up: [x] Yes  [] No    Plan:   [x] Continue per plan of care [] Alter current plan (see comments)  [] Plan of care initiated [] Hold pending MD visit [] Discharge  Plan for Next Session:      Electronically signed by:  Missy Duncan PT

## 2019-04-30 ENCOUNTER — HOSPITAL ENCOUNTER (OUTPATIENT)
Dept: PHYSICAL THERAPY | Age: 49
Setting detail: THERAPIES SERIES
Discharge: HOME | End: 2019-04-30
Payer: COMMERCIAL

## 2019-04-30 PROCEDURE — 97035 APP MDLTY 1+ULTRASOUND EA 15: CPT

## 2019-04-30 PROCEDURE — 97140 MANUAL THERAPY 1/> REGIONS: CPT

## 2019-04-30 PROCEDURE — 97110 THERAPEUTIC EXERCISES: CPT

## 2019-04-30 NOTE — FLOWSHEET NOTE
Physical Therapy Daily Treatment Note  Date:  2019    Patient Name:  Florian Velez    :  1970  MRN: [id number]  Restrictions/Precautions:    Pertinent Medical History:  Medical/Treatment Diagnosis Information:  · Diagnosis: R foot pain (M79.671); plantar fasciitis of R foot (M72.2); Pes planus of both feet (M21.41, M21.42)  · Treatment Diagnosis: Decreased functional mobility  Insurance/Certification information:  PT Insurance Information: [employer]ill[employer] Easley  Physician Information:  Referring Practitioner: Dr. Agustin Veliz of care signed (Y/N):  Sent to in basket on 19  Visit# / total visits:  3/8  Pain level: /10     Progress Note: []  Yes  []  No  Next due by: Visit #10      History of Injury:Pt stated he started to have R foot pain on 19. Pt had on/off pain since 19. 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 Pt saw Dr. Hilda Landa on 3/10/19. Pt was given a steroid pack and antiinflammatory and instructed to begin PT. Pt started PT late because \"baseball season started. \"  Pt rated R foot pain 0-3/10, but could spike after an activity (officiating) up to 5/10. Pt was instructed in a couple stretches previously. At home pt has pain with grass cutting or weed eating and stair climbing. 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 Pt wears \"heavier shoes\" as \"the whole shoe is just a big piece of armour- they're not spikes. \"   Pt has been wearing inserts for his \"flat arches\" which helps with pain. Pt has early morning foot stiffness. 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 Gait: L LE ER. Stair climbing: L LE ER, otherwise WFL. Heel walk: (-); toe walk (-).     TTP: R plantar fascia  AROM RLE (degrees)  R Ankle Dorsiflexion 0-20: (Gastrocs) R 2, L 8.  R Ankle Forefoot Inversion 0-40: 40, L 35  R Ankle Forefoot Eversion 0-20: R 12, L 27  Joint Mobility: ROM RLE: Calcaneus PROM INV R 20, L 25; EV R 2, L 8.  Strength RLE: WFL  Strength LLE: WFL      Exercises:  Exercise/Equipment Resistance/Repetitions Other comments   Gastroc incline stretch 2x30\"    Standing HS stretch 2x30\"    BAPS R LE  PF/DF  INV/EV  CW/CCW 30x each- level II    BOSU- dome up  Unilateral balance   10\" hold, 10x    BOSU- flat up  Lateral rocking  AP rocking   30x  30x                                                    Other Therapeutic Activities:      Home Exercise Program:    4/22/19 HEP was instructed and included belt gastroc stretch, manual plantar fascia stretch, gastroc step stretch; towel toe curls. Pt was given written hand outs and demonstrated exercises correctly. Pt expressed understanding of exercises.       Manual Treatments:  DTM R plantar fascia, R calcaneal INV mob, TC mob AP, 20' total    Modalities:  US 1.5 w/cm2, 100%, 8'    Timed Code Treatment Minutes:  42    Total Treatment Minutes:  42    Assessment  [] Patient tolerated treatment well [] Patient limited by fatigue  [x] Patient limited by pain  [] Patient limited by other medical complications  [] Other:         Prognosis: [x] Good [] Fair  [] Poor    Patient Requires Follow-up: [x] Yes  [] No    Plan:   [x] Continue per plan of care [] Alter current plan (see comments)  [] Plan of care initiated [] Hold pending MD visit [] Discharge  Plan for Next Session:      Electronically signed by:  Bunny Alcala PT

## 2019-05-03 ENCOUNTER — HOSPITAL ENCOUNTER (OUTPATIENT)
Dept: PHYSICAL THERAPY | Age: 49
Setting detail: THERAPIES SERIES
Discharge: HOME | End: 2019-05-03
Payer: COMMERCIAL

## 2019-05-03 NOTE — FLOWSHEET NOTE
Physical Therapy  Cancellation/No-show Note  Patient Name:  Geoff De La Cruz  :  1970   Date:  5/3/2019  Cancelled visits to date: 1  No-shows to date: 0    For today's appointment patient:  [x]  Cancelled  []  Rescheduled appointment  []  No-show     Reason given by patient:  []  Patient ill  []  Conflicting appointment  []  No transportation    [x]  Conflict with work  []  No reason given  []  Other:     Comments:      Electronically signed by:  Roxy Damico PT

## 2019-05-07 ENCOUNTER — HOSPITAL ENCOUNTER (OUTPATIENT)
Dept: PHYSICAL THERAPY | Age: 49
Setting detail: THERAPIES SERIES
Discharge: HOME | End: 2019-05-07
Payer: COMMERCIAL

## 2019-05-07 PROCEDURE — 97035 APP MDLTY 1+ULTRASOUND EA 15: CPT

## 2019-05-07 PROCEDURE — 97140 MANUAL THERAPY 1/> REGIONS: CPT

## 2019-05-07 PROCEDURE — 97110 THERAPEUTIC EXERCISES: CPT

## 2019-05-07 NOTE — FLOWSHEET NOTE
Forefoot Inversion 0-40: 40, L 35  R Ankle Forefoot Eversion 0-20: R 12, L 27  Joint Mobility: ROM RLE: Calcaneus PROM INV R 20, L 25; EV R 2, L 8.  Strength RLE: WFL  Strength LLE: WFL      Exercises:  Exercise/Equipment Resistance/Repetitions Other comments   Gastroc incline stretch 2x30\"    Standing HS stretch 2x30\"    BAPS R LE  PF/DF  INV/EV  CW/CCW 30x each- level II    BOSU- dome up  Unilateral balance   10\" hold, 10x    BOSU- flat up  Lateral rocking  AP rocking   30x  30x                                                    Other Therapeutic Activities:      Home Exercise Program:    4/22/19 HEP was instructed and included belt gastroc stretch, manual plantar fascia stretch, gastroc step stretch; towel toe curls. Pt was given written hand outs and demonstrated exercises correctly. Pt expressed understanding of exercises.       Manual Treatments:  DTM R plantar fascia, R calcaneal INV mob, TC mob AP, 20' total    Modalities:  US 1.5 w/cm2, 100%, 8'    Timed Code Treatment Minutes:  42    Total Treatment Minutes:  42    Assessment  [] Patient tolerated treatment well [] Patient limited by fatigue  [x] Patient limited by pain  [] Patient limited by other medical complications  [] Other:         Prognosis: [x] Good [] Fair  [] Poor    Patient Requires Follow-up: [x] Yes  [] No    Plan:   [x] Continue per plan of care [] Alter current plan (see comments)  [] Plan of care initiated [] Hold pending MD visit [] Discharge  Plan for Next Session:      Electronically signed by:  Deon Lynne PT

## 2019-05-09 ENCOUNTER — HOSPITAL ENCOUNTER (OUTPATIENT)
Dept: PHYSICAL THERAPY | Age: 49
Setting detail: THERAPIES SERIES
Discharge: HOME | End: 2019-05-09
Payer: COMMERCIAL

## 2019-05-09 PROCEDURE — 97140 MANUAL THERAPY 1/> REGIONS: CPT

## 2019-05-09 PROCEDURE — 97110 THERAPEUTIC EXERCISES: CPT

## 2019-05-09 PROCEDURE — 97035 APP MDLTY 1+ULTRASOUND EA 15: CPT

## 2019-05-09 NOTE — FLOWSHEET NOTE
Physical Therapy Daily Treatment Note  Date:  2019    Patient Name:  Stiven Toro    :  1970  MRN: [id number]  Restrictions/Precautions:    Pertinent Medical History:  Medical/Treatment Diagnosis Information:  · Diagnosis: R foot pain (M79.671); plantar fasciitis of R foot (M72.2); Pes planus of both feet (M21.41, M21.42)  · Treatment Diagnosis: Decreased functional mobility  Insurance/Certification information:  PT Insurance Information: [employer]ill[employer] Easley  Physician Information:  Referring Practitioner: Dr. Kate Aguilar of care signed (Y/N):  Sent to in basket on 19  Visit# / total visits:    Pain level: /10     Progress Note: []  Yes  []  No  Next due by: Visit #10      History of Injury:Pt stated he started to have R foot pain on 19. Pt had on/off pain since 19. 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 Pt saw Dr. Sarah Gomez on 3/10/19. Pt was given a steroid pack and antiinflammatory and instructed to begin PT. Pt started PT late because \"baseball season started. \"  Pt rated R foot pain 0-3/10, but could spike after an activity (officiating) up to 5/10. Pt was instructed in a couple stretches previously. At home pt has pain with grass cutting or weed eating and stair climbing. 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 Pt wears \"heavier shoes\" as \"the whole shoe is just a big piece of armour- they're not spikes. \"   Pt has been wearing inserts for his \"flat arches\" which helps with pain. Pt has early morning foot stiffness. 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 Gait: L LE ER. Stair climbing: L LE ER, otherwise WFL. Heel walk: (-); toe walk (-).     TTP: R plantar fascia  AROM RLE (degrees)  R Ankle Dorsiflexion 0-20: (Gastrocs) R 2, L 8.  R Ankle Forefoot Inversion 0-40: 40, L 35  R Ankle Forefoot Eversion 0-20: R 12, L 27  Joint Mobility: ROM RLE: Calcaneus PROM INV R 20, L 25; EV R 2, L 8.  Strength RLE: WFL  Strength LLE: [facility]   5/9/19  Posture: L LE ER, otherwise WFL. TTP: R plantar fascia  AROM RLE (degrees)  R Ankle Dorsiflexion 0-20: (Gastrocs) R 8  R Ankle Forefoot Eversion 0-20: R 20  Joint Mobility: Calcaneus PROM INV R 22; EV R 12        Exercises:  Exercise/Equipment Resistance/Repetitions Other comments   Gastroc incline stretch 2x30\"    Standing HS stretch 2x30\"    BAPS R LE  PF/DF  INV/EV  CW/CCW 30x each- level II    BOSU- dome up  Unilateral balance   10\" hold, 10x    BOSU- flat up  Lateral rocking  AP rocking   30x  30x                                                    Other Therapeutic Activities:      Home Exercise Program:    4/22/19 HEP was instructed and included belt gastroc stretch, manual plantar fascia stretch, gastroc step stretch; towel toe curls. Pt was given written hand outs and demonstrated exercises correctly. Pt expressed understanding of exercises.       Manual Treatments:  DTM R plantar fascia, R calcaneal INV mob, TC mob AP, 20' total    Modalities:  US 1.5 w/cm2, 100%, 8'    Timed Code Treatment Minutes:  42    Total Treatment Minutes:  42    Assessment  [] Patient tolerated treatment well [] Patient limited by fatigue  [x] Patient limited by pain  [] Patient limited by other medical complications  [] Other:         Prognosis: [x] Good [] Fair  [] Poor    Patient Requires Follow-up: [x] Yes  [] No    Plan:   [x] Continue per plan of care [] Alter current plan (see comments)  [] Plan of care initiated [] Hold pending MD visit [] Discharge  Plan for Next Session:      Electronically signed by:  Ashwin Ruano PT

## 2019-05-14 ENCOUNTER — HOSPITAL ENCOUNTER (OUTPATIENT)
Dept: PHYSICAL THERAPY | Age: 49
Setting detail: THERAPIES SERIES
Discharge: HOME | End: 2019-05-14
Payer: COMMERCIAL

## 2019-05-14 NOTE — FLOWSHEET NOTE
Physical Therapy  Cancellation/No-show Note  Patient Name:  Rubi Jimenez  :  1970   Date:  2019  Cancelled visits to date: 2   No-shows to date: 0    For today's appointment patient:  [x]  Cancelled  []  Rescheduled appointment  []  No-show     Reason given by patient:  []  Patient ill  []  Conflicting appointment  []  No transportation    [x]  Conflict with work  []  No reason given  []  Other:     Comments:      Electronically signed by:  Andrews Chavez PT

## 2019-05-16 ENCOUNTER — APPOINTMENT (OUTPATIENT)
Dept: PHYSICAL THERAPY | Age: 49
End: 2019-05-16
Payer: COMMERCIAL

## 2020-09-21 ENCOUNTER — TELEPHONE (OUTPATIENT)
Dept: ORTHOPEDIC SURGERY | Age: 50
End: 2020-09-21

## 2020-09-21 ENCOUNTER — OFFICE VISIT (OUTPATIENT)
Dept: ORTHOPEDIC SURGERY | Age: 50
End: 2020-09-21
Payer: COMMERCIAL

## 2020-09-21 VITALS — HEIGHT: 70 IN | BODY MASS INDEX: 32.21 KG/M2 | WEIGHT: 225 LBS

## 2020-09-21 PROCEDURE — 99214 OFFICE O/P EST MOD 30 MIN: CPT | Performed by: FAMILY MEDICINE

## 2020-09-21 RX ORDER — METHYLPREDNISOLONE 4 MG/1
TABLET ORAL
Qty: 21 KIT | Refills: 0 | Status: SHIPPED | OUTPATIENT
Start: 2020-09-21 | End: 2020-10-28 | Stop reason: ALTCHOICE

## 2020-09-21 RX ORDER — DICLOFENAC SODIUM 75 MG/1
75 TABLET, DELAYED RELEASE ORAL 2 TIMES DAILY
Qty: 60 TABLET | Refills: 3 | Status: SHIPPED | OUTPATIENT
Start: 2020-09-21 | End: 2022-01-17

## 2020-09-21 NOTE — PROGRESS NOTES
Systems  Pertinent items are noted in HPI  Review of systems reviewed from Patient History Form dated on 9/21/2020 and available in the patient's chart under the Media tab. Vital Signs  There were no vitals filed for this visit. General Exam:     Constitutional: Patient is adequately groomed with no evidence of malnutrition  DTRs: Deep tendon reflexes are intact  Mental Status: The patient is oriented to time, place and person. The patient's mood and affect are appropriate. Lymphatic: The lymphatic examination bilaterally reveals all areas to be without enlargement or induration. Vascular: Examination reveals no swelling or calf tenderness. Peripheral pulses are palpable and 2+. Neurological: The patient has good coordination. There is no weakness or sensory deficit. Knee Examination  Inspection:  There is no high-grade deformity of the does have trac eknee joint effusion. No patellofemoral crepitation noted. Palpation:  He does have clinical tenderness mildly at 0-1 out of 10 with palpation of the medial joint line. He does have moderate tenderness along the lateral femoral condyle and over the anterior and central portion of the lateral joint line. Mild IT band tenderness. No substantial MCL tenderness proximally or distally. Rang of Motion:  He does have near full extension on the flexion beyond 110 is mildly painful to the posterior medial knee. Strength:  4+ out of 5 with knee flexion and extension. Some tightness of her hamstrings. Special Tests:  He does have mild pain with medial Dameon's. He does have tenderness along the anterior and central lateral joint line worsening with Dameon's which he rates at about a 7 out of 10. There is no high-grade clicks. Negative medial Dameon's and there is no laxity with varus or valgus stress. Lockman stable. Negative posterior drawer. Mild discomfort with patellar grind testing.      Skin: There are no rashes, ulcerations or lesions. Distal neurovascular exam is intact. Gait: Mild altalgia    Reflex symmetrically preserved    Additional Comments:     Additional Examinations:  Contralateral Exam: Examination of the right knee reveals intact skin. There is no focal tenderness. The patient demonstrates full painless range of motion with regards to flexion and extension. Strength is 5/5 thorough out all planes. Ligamentous stability is grossly intact. Examination of the bilateral hip reveals intact skin. The patient demonstrates full painless range of motion with regards to flexion, abduction, internal and external rotation. There is not tenderness about the greater trochanter. There is a negative straight leg raise against resistance. Strength is 5/5 thorough out all planes. Right Lower Extremity: Examination of the right lower extremity does not show any tenderness, deformity or injury. Range of motion is unremarkable. There is no gross instability. There are no rashes, ulcerations or lesions. Strength and tone are normal.  Left Lower Extremity: Examination of the left lower extremity does not show any tenderness, deformity or injury. Range of motion is unremarkable. There is no gross instability. There are no rashes, ulcerations or lesions. Strength and tone are normal.      Diagnostic Test Findings: Left knee AP and PA weight-bearing sunrise and lateral films are obtained today and they show mild medial compartment narrowing and mild patellofemoral arthropathy but no high-grade degenerative changes or obvious loose bodies identified. Assessment :  #1.  3 days status post weightbearing valgus injury left knee with known history of underlying mild osteoarthritis medial compartment with patellofemoral arthropathy with possible occult lateral meniscus/lateral femoral condylar bone bruise synovitis   Impression:  Encounter Diagnoses   Name Primary?     Left knee pain, unspecified chronicity Yes    Contusion of left knee, initial encounter     Primary osteoarthritis of left knee     Chondromalacia patellae of left knee        Office Procedures:  Orders Placed This Encounter   Procedures    XR KNEE LEFT (MIN 4 VIEWS)     Order Specific Question:   Reason for exam:     Answer:   LEFT KNEE PAIN    MRI KNEE LEFT WO CONTRAST     Standing Status:   Future     Standing Expiration Date:   12/21/2020     Scheduling Instructions:      [facility] [place] [address]      [phone]            AUTH #:      TIME AND DATE TBD      PLEASE CALL PATIENT ONCE APPROVED TO SCHEDULE       PUSH TO [employer]            Remember that it may take several business days to pre-cert your MRI through your insurance. Our office will contact you as soon as we have the approval. We will not give any test results over the phone. Please call [phone] once you have your test day and time to schedule a follow up with Dr. Merari Meredith. Order Specific Question:   Reason for exam:     Answer:   r/o lateral meniscus tear, lateral femoral condyle bruise, any mcl pathology, OA CMP       Treatment Plan:  Treatment options were discussed with Joan Colin. We did review his updated plain films and exam findings. He is now 3 days out from his valgus rotational injury to his left knee. As noted he does have underlying patellofemoral arthropathy with mild medial compartment osteoarthritis but most of his pain is over the lateral joint line at this point. I would like for him to have an MRI to rule out an occult lateral meniscus tear and or ligamentous injury. We did place him on a Medrol Dosepak to be followed by diclofenac 75 mg 1 pill twice daily and he will utilize his patellar stabilizing brace. Continue with his gentle motion and stretching program.  Icing and activity modification was discussed.   Hopefully we can get his MRI rather expeditiously and see him back later this week in the office. He will contact us in the interim with questions or concerns. This dictation was performed with a verbal recognition program (DRAGON) and it was checked for errors. It is possible that there are still dictated errors within this office note. If so, please bring any errors to my attention for an addendum. All efforts were made to ensure that this office note is accurate.

## 2020-09-21 NOTE — TELEPHONE ENCOUNTER
Patient is calling and would like to be seen for left knee injury during football game was tackled and took a helmet to the knee.   [phone]

## 2020-09-22 ENCOUNTER — TELEPHONE (OUTPATIENT)
Dept: ORTHOPEDIC SURGERY | Age: 50
End: 2020-09-22

## 2020-09-22 NOTE — TELEPHONE ENCOUNTER
Left voicemail for patient that their MRI has been authorized and that they can call and schedule scan at their convenience. Also told them that they can call and schedule a f/u with Dr. Megan Ward once they have MRI scheduled, leaving at least 2-3 days for our office to receive their results.

## 2020-09-24 ENCOUNTER — OFFICE VISIT (OUTPATIENT)
Dept: ORTHOPEDIC SURGERY | Age: 50
End: 2020-09-24
Payer: COMMERCIAL

## 2020-09-24 VITALS — WEIGHT: 225 LBS | HEIGHT: 70 IN | BODY MASS INDEX: 32.21 KG/M2

## 2020-09-24 PROCEDURE — 99213 OFFICE O/P EST LOW 20 MIN: CPT | Performed by: FAMILY MEDICINE

## 2020-09-24 PROCEDURE — L1830 KO IMMOB CANVAS LONG PRE OTS: HCPCS | Performed by: FAMILY MEDICINE

## 2020-09-24 NOTE — PROGRESS NOTES
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 Review of left knee imaging    History of Present Illness:  Matthew Simpson is a 45 y.o. 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 Deonte Perez who is being seen today for evaluation of a new acute injury to his left knee. We had seen him about 2-1/2 years ago for underlying left knee chondromalacia and mild underlying osteoarthritis and underwent a successful treatment with Visco supplementation. 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 There was no reports a pop or crack or though he did have immediate pain anteriorly and medially. There is no sense of shifting or pop. He did have pain immediately but opted to continue refereeing the game. He did not develop a great deal of swelling but was quite sore. He states that he is having an achy pain a 2-3 out of 10 but if he attempts to pivot suddenly or undergo a lateral motion will be quite substantial at 8 out of 10. Most of his pain is anterior lateral.  He has having a little medial pain. He states it feels different from his underlying arthritic changes. He may have had a few episodes of catching but does not have oliver locking. He has been taking his anti-inflammatories with diclofenac and has been icing. His symptoms have not improved over the last 3 days. He is being seen today for orthopedic and sports consultation with updated imaging. It was initially evaluated in the office on 9/21/2020 and is now 6 days out from his knee injury sustained while refereeing a football game.   He was quite tender laterally some medially and we did send him for an MRI of his knee. His MRI was obtained at [facility] on 9/23/2020 and did show an acute thankfully nondisplaced nondepressed fracture to the tibial plateau as well as his chronic trizonal complex tear to the posterior horn and body of the medial meniscus which may have propagated somewhat and is now 3 to 4 cm in nature. He did have underlying arthritic changes noted. Tissue swelling was also noted. He is feeling somewhat better since his initial evaluation after starting on his anti-inflammatories and has taken 2 days of the Medrol Dosepak. I sensations of loose bodies locking or catching. Medical History     Patient's medications, allergies, past medical, surgical, social and family histories were reviewed and updated as appropriate. Review of Systems  Pertinent items are noted in HPI  Review of systems reviewed from Patient History Form dated on 9/21/2020 and available in the patient's chart under the Media tab. Vital Signs  There were no vitals filed for this visit. General Exam:     Constitutional: Patient is adequately groomed with no evidence of malnutrition  DTRs: Deep tendon reflexes are intact  Mental Status: The patient is oriented to time, place and person. The patient's mood and affect are appropriate. Lymphatic: The lymphatic examination bilaterally reveals all areas to be without enlargement or induration. Vascular: Examination reveals no swelling or calf tenderness. Peripheral pulses are palpable and 2+. Neurological: The patient has good coordination. There is no weakness or sensory deficit. Knee Examination  Inspection:  There is no high-grade deformity of the does have trac eknee joint effusion. No patellofemoral crepitation noted. Palpation:  He does have clinical tenderness mildly at 0-1 out of 10 with palpation of the medial joint line.   He does have moderate tenderness along the lateral femoral condyle and lateral tibial plateau which he rates at 7-8 out of 10. Mild IT band tenderness. No substantial MCL tenderness proximally or distally. Rang of Motion:  He does have near full extension on the flexion beyond 110 is mildly painful to the posterior and lateral medial knee. Strength:  4+ out of 5 with knee flexion and extension. Some tightness of her hamstrings. Special Tests:  He does have mild pain with medial aDmeon's. He does have tenderness along the anterior and central lateral joint line worsening with Dameon's which he rates at about a 7 out of 10. There is no high-grade clicks. Negative medial Dameon's and there is no laxity with varus or valgus stress. Lockman stable. Negative posterior drawer. Mild discomfort with patellar grind testing. Skin: There are no rashes, ulcerations or lesions. Distal neurovascular exam is intact. Gait: Improved gait    Reflex symmetrically preserved    Additional Comments:     Additional Examinations:  Contralateral Exam: Examination of the right knee reveals intact skin. There is no focal tenderness. The patient demonstrates full painless range of motion with regards to flexion and extension. Strength is 5/5 thorough out all planes. Ligamentous stability is grossly intact. Examination of the bilateral hip reveals intact skin. The patient demonstrates full painless range of motion with regards to flexion, abduction, internal and external rotation. There is not tenderness about the greater trochanter. There is a negative straight leg raise against resistance. Strength is 5/5 thorough out all planes. Right Lower Extremity: Examination of the right lower extremity does not show any tenderness, deformity or injury. Range of motion is unremarkable. There is no gross instability. There are no rashes, ulcerations or lesions.   Strength and tone are normal.  Left Lower Extremity: Examination of the left lower extremity does not show any tenderness, deformity or injury. Range of motion is unremarkable. There is no gross instability. There are no rashes, ulcerations or lesions. Strength and tone are normal.      Diagnostic Test Findings: The MRI obtained at [facility] AT [facility] 2020 as listed above  Narrative    Site: [facility] Rad #: [id number] #: [id number] Procedure: MR Left Knee w/o Contrast ; Reason for Exam: rule out lateral meniscus tear, lateral femoral condyle bruise, MCL pathology, OA, CMP    This document is confidential medical information.  Unauthorized disclosure or use of this information is prohibited by law. If you are not the intended recipient of this document, please advise us by calling immediately [phone].         [address]              Patient Name: Gary Irizarry    Case ID: [id number]    Patient : 1970    Referring Physician: Comfort Mccray MD    Exam Date: 2020    Exam Description: MR Left Knee w/o Contrast              HISTORY:  Evaluate lateral meniscus tear, lateral femoral condyle bruise, MCL pathology, OA,    CMP.         TECHNICAL FACTORS:  Long- and short-axis fat- and water-weighted images were performed.         COMPARISON:  Prior left knee MRI 2018.         FINDINGS:  ACL, PCL, LCL and MCL are intact.         Edema within the lateral tibia and anterolateral tibia.  Contusion and subcortical fracture    present.  This is new relative to the prior study.         Anterolateral subcutis swelling.  Inflammation also noted anteriorly and medially.  Soft tissue     contusion and sprain present.         Thickened MCL.  Chronic sprain and scarring present.  LCL is intact.         3-4 cm complex trizonal pattern of tearing involves the posterior horn and body of the medial    meniscus.  This has propagated relative to the prior study.  Small parameniscal cyst posterior    horn-body junction measures 4 mm.  Grade 3 chondral irregularity involves the medial femur,    medial tibia, trochlear groove and patellar cartilage.         CONCLUSION:    1. Acute subcortical fracture with surrounding contusion anterolateral tibial plateau. 2. 3-4 cm trizonal tear posterior horn-body junction medial meniscus with foci of parameniscal    cyst measuring 4 mm.  This has propagated relative to the prior study. 3. Anterolateral and less so anteromedial subcutis soft tissue contusion.         Thank you for the opportunity to provide your interpretation.                   Carmella Waite MD         A: MATTHEW/dony 09/23/2020 1:41 PM    T: DONY 09/23/2020 12:41 PM          Assessment :  #1.  6 days status post weightbearing valgus injury left knee with known history of underlying mild osteoarthritis medial compartment with patellofemoral arthropathy with MRI documented nondisplaced/nondepressed lateral tibial plateau fracture   Impression:  Encounter Diagnoses   Name Primary?  Closed fracture of lateral portion of left tibial plateau, initial encounter Yes    Primary osteoarthritis of left knee     Old tear of medial meniscus of left knee, unspecified tear type     Contusion of left knee, initial encounter        Office Procedures:  Orders Placed This Encounter   Procedures    DJO Knee Immobilizer     Patient was prescribed a DJO Knee Immobilizer. The left knee will require stabilization / immobilization from this semi-rigid / rigid orthosis to improve their function. The orthosis will assist in protecting the affected area, provide functional support and facilitate healing. The prefabricated orthosis was modified in the following manner to provide a customizable fit for the patient at the time of delivery. 1. Identification of appropriate positioning and alignment of anatomical landmarks. 2. Trimming of straps and panels. Reassemble orthosis to specifically fit patient.      The patient was educated and fit by a healthcare professional with expert knowledge and specialization in brace application while under the direct supervision of the treating physician. Verbal and written instructions for the use of and application of this item were provided. They were instructed to contact the office immediately should the brace result in increased pain, decreased sensation, increased swelling or worsening of the condition. Treatment Plan:  Treatment options were discussed with Alondra Robert. We did review his updated plain films recent left knee MRI and exam findings. He is now 6 days out from his valgus rotational injury to his left knee. As noted he does have underlying patellofemoral arthropathy with mild medial compartment osteoarthritis but his MRI does show evidence of a nondepressed lateral tibial plateau fracture early present on his MRI. Very from his wraparound brace to a knee immobilizer which she will need for the next several weeks. He will stop his Medrol Dosepak but start his diclofenac 75 mg 1 pill twice daily. Icing was recommended. I like to see him back next week for AP lateral and oblique films x2 of his left knee. He will likely miss the next 6 weeks of football but does anticipate refereeing for basketball this winter as well. 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 He will contact us in the interim with questions or concerns. This dictation was performed with a verbal recognition program (DRAGON) and it was checked for errors. It is possible that there are still dictated errors within this office note. If so, please bring any errors to my attention for an addendum. All efforts were made to ensure that this office note is accurate.

## 2020-10-01 ENCOUNTER — OFFICE VISIT (OUTPATIENT)
Dept: ORTHOPEDIC SURGERY | Age: 50
End: 2020-10-01
Payer: COMMERCIAL

## 2020-10-01 VITALS — WEIGHT: 235 LBS | TEMPERATURE: 97.3 F | HEIGHT: 71 IN | BODY MASS INDEX: 32.9 KG/M2

## 2020-10-01 PROCEDURE — 99213 OFFICE O/P EST LOW 20 MIN: CPT | Performed by: FAMILY MEDICINE

## 2020-10-01 NOTE — PROGRESS NOTES
Chief Complaint  Follow-up (LT Knee)      Follow-up left knee pain with MRI documented lateral tibial plateau fracture with chronic trizonal complex tear posterior horn and body medial meniscus with underlying osteoarthritis. Patient here for x-ray check. History of Present Illness:  Gregg Flores is a 45 y.o. 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 Zamzam Keita who is being seen today for evaluation of a new acute injury to his left knee. We had seen him about 2-1/2 years ago for underlying left knee chondromalacia and mild underlying osteoarthritis and underwent a successful treatment with Visco supplementation. 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 There was no reports a pop or crack or though he did have immediate pain anteriorly and medially. There is no sense of shifting or pop. He did have pain immediately but opted to continue refereeing the game. He did not develop a great deal of swelling but was quite sore. He states that he is having an achy pain a 2-3 out of 10 but if he attempts to pivot suddenly or undergo a lateral motion will be quite substantial at 8 out of 10. Most of his pain is anterior lateral.  He has having a little medial pain. He states it feels different from his underlying arthritic changes. He may have had a few episodes of catching but does not have oliver locking. He has been taking his anti-inflammatories with diclofenac and has been icing. His symptoms have not improved over the last 3 days. He is being seen today for orthopedic and sports consultation with updated imaging.     It was initially evaluated in the office on 9/21/2020 and is now 6 days out from his knee injury sustained while refereeing a football game. He was quite tender laterally some medially and we did send him for an MRI of his knee. His MRI was obtained at [facility] on 9/23/2020 and did show an acute thankfully nondisplaced nondepressed fracture to the tibial plateau as well as his chronic trizonal complex tear to the posterior horn and body of the medial meniscus which may have propagated somewhat and is now 3 to 4 cm in nature. He did have underlying arthritic changes noted. Tissue swelling was also noted. He is feeling somewhat better since his initial evaluation after starting on his anti-inflammatories and has taken 2 days of the Medrol Dosepak. I sensations of loose bodies locking or catching. Last seen in the office on 9/24/2020 and is now 13 days out from his MRI documented lateral tibial plateau fracture to his left knee. He is doing much better is not having pain is been tolerating his knee immobilizer. His swelling is improved and he is continued with his anti-inflammatory medications. Denies locking catching or true instability. He is out of the remainder football season. 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 Would be able to hold off on repetitive ladder usage and climbing for the first few weeks he believes. Denies locking catching or true instability symptoms. Medical History     Patient's medications, allergies, past medical, surgical, social and family histories were reviewed and updated as appropriate. Review of Systems  Pertinent items are noted in HPI  Review of systems reviewed from Patient History Form dated on 9/21/2020 and available in the patient's chart under the Media tab. Vital Signs  Vitals:    10/01/20 1436   Temp: 97.3 °F (36.3 °C)       General Exam:     Constitutional: Patient is adequately groomed with no evidence of malnutrition  DTRs: Deep tendon reflexes are intact  Mental Status:  The patient is oriented to time, place and person. The patient's mood and affect are appropriate. Lymphatic: The lymphatic examination bilaterally reveals all areas to be without enlargement or induration. Vascular: Examination reveals no swelling or calf tenderness. Peripheral pulses are palpable and 2+. Neurological: The patient has good coordination. There is no weakness or sensory deficit. Knee Examination  Inspection:  There is no high-grade deformity of the does have trac eknee joint effusion. No patellofemoral crepitation noted. Palpation:  He no longer has clinical tenderness mildly at 0 out of 10 with palpation of the medial joint line. He does have mild tenderness along the lateral femoral condyle and lateral tibial plateau which he rates at 3 out of 10. Mild IT band tenderness. No substantial MCL tenderness proximally or distally. Rang of Motion:  He does have near full extension on the flexion beyond 115 is coupled for only mild soreness to the posterior and lateral medial knee. Strength:  4+ out of 5 with knee flexion and extension. Some tightness of her hamstrings. Special Tests:  He does have this tenderness along the anterior and central lateral joint line attempted Dameon's testing today. I do not sense high-grade instability only minimal discomfort with patellar grind testing. Skin: There are no rashes, ulcerations or lesions. Distal neurovascular exam is intact. Gait: Improved gait and is using his knee immobilizer. Reflex symmetrically preserved    Additional Comments:     Additional Examinations:  Contralateral Exam: Examination of the right knee reveals intact skin. There is no focal tenderness. The patient demonstrates full painless range of motion with regards to flexion and extension. Strength is 5/5 thorough out all planes. Ligamentous stability is grossly intact. Examination of the bilateral hip reveals intact skin.   The patient demonstrates full painless range of motion with regards to flexion, abduction, internal and external rotation. There is not tenderness about the greater trochanter. There is a negative straight leg raise against resistance. Strength is 5/5 thorough out all planes. Right Lower Extremity: Examination of the right lower extremity does not show any tenderness, deformity or injury. Range of motion is unremarkable. There is no gross instability. There are no rashes, ulcerations or lesions. Strength and tone are normal.  Left Lower Extremity: Examination of the left lower extremity does not show any tenderness, deformity or injury. Range of motion is unremarkable. There is no gross instability. There are no rashes, ulcerations or lesions. Strength and tone are normal.      Diagnostic Test Findings: The MRI obtained at [facility] 2020 as listed above  Narrative    Site: [facility] Rad #: [id number] #: [id number] Procedure: MR Left Knee w/o Contrast ; Reason for Exam: rule out lateral meniscus tear, lateral femoral condyle bruise, MCL pathology, OA, CMP    This document is confidential medical information.  Unauthorized disclosure or use of this information is prohibited by law. If you are not the intended recipient of this document, please advise us by calling immediately [phone].         [address]              Patient Name: Adam Perez    Case ID: [id number]    Patient : 1970    Referring Physician: Fara Gardiner MD    Exam Date: 2020    Exam Description: MR Left Knee w/o Contrast              HISTORY:  Evaluate lateral meniscus tear, lateral femoral condyle bruise, MCL pathology, OA,    CMP.         TECHNICAL FACTORS:  Long- and short-axis fat- and water-weighted images were performed.         COMPARISON:  Prior left knee MRI 2018.         FINDINGS:  ACL, PCL, LCL and MCL are intact.         Edema within the lateral tibia and anterolateral tibia.  Contusion and subcortical fracture    present.  This is new relative to the prior study.         Anterolateral subcutis swelling.  Inflammation also noted anteriorly and medially.  Soft tissue     contusion and sprain present.         Thickened MCL.  Chronic sprain and scarring present.  LCL is intact.         3-4 cm complex trizonal pattern of tearing involves the posterior horn and body of the medial    meniscus.  This has propagated relative to the prior study.  Small parameniscal cyst posterior    horn-body junction measures 4 mm.  Grade 3 chondral irregularity involves the medial femur,    medial tibia, trochlear groove and patellar cartilage.         CONCLUSION:    1. Acute subcortical fracture with surrounding contusion anterolateral tibial plateau. 2. 3-4 cm trizonal tear posterior horn-body junction medial meniscus with foci of parameniscal    cyst measuring 4 mm.  This has propagated relative to the prior study. 3. Anterolateral and less so anteromedial subcutis soft tissue contusion.         Thank you for the opportunity to provide your interpretation.                   Jane Ramirez MD         A: MATTHEW/dony 09/23/2020 1:41 PM    T: TK 09/23/2020 12:41 PM      Left knee AP lateral oblique films x2 does show stable appearance to his nondepressed nondisplaced lateral tibial plateau fracture. Assessment :  #1.  13 days status post weightbearing valgus injury left knee with known history of underlying mild osteoarthritis medial compartment with patellofemoral arthropathy with MRI documented nondisplaced/nondepressed lateral tibial plateau fracture   Impression:  Encounter Diagnosis   Name Primary?     Closed fracture of lateral portion of left tibial plateau, initial encounter Yes       Office Procedures:  Orders Placed This Encounter   Procedures    XR KNEE LEFT (MIN 4 VIEWS)     4V L Knee     Standing Status:   Future     Number of Occurrences:   1     Standing Expiration Date:   11/1/2020     Order Specific Question:   Reason for exam:     Answer:   Knee Pain       Treatment Plan:  Treatment options were discussed with Susan Houston. We did review his updated plain films recent left knee MRI and exam findings. He is now 13 days out from his valgus rotational injury to his left knee. As noted he does have underlying patellofemoral arthropathy with mild medial compartment osteoarthritis but his MRI does show evidence of a nondepressed lateral tibial plateau fracture early present on his MRI. Like for him to continue his knee immobilizer for the next couple of weeks how though he was previously given a wraparound brace which we hope to convert him into in a couple of weeks. Icing was recommended. I like to see him back next week for AP lateral and oblique films x2 of his left knee. He will likely miss the next 5 weeks of football but does anticipate refereeing for basketball this winter as well. 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 We will see him back at the [place] office next week for an x-ray check and then likely the following Wednesday at [facility] prior to him starting a new job and will write for modified duties. Ava Grullon He will contact us in the interim with questions or concerns. This dictation was performed with a verbal recognition program (DRAGON) and it was checked for errors. It is possible that there are still dictated errors within this office note. If so, please bring any errors to my attention for an addendum. All efforts were made to ensure that this office note is accurate.

## 2020-10-08 ENCOUNTER — OFFICE VISIT (OUTPATIENT)
Dept: ORTHOPEDIC SURGERY | Age: 50
End: 2020-10-08
Payer: COMMERCIAL

## 2020-10-08 VITALS — TEMPERATURE: 97.5 F | BODY MASS INDEX: 33.64 KG/M2 | HEIGHT: 70 IN | WEIGHT: 235 LBS

## 2020-10-08 PROCEDURE — 99213 OFFICE O/P EST LOW 20 MIN: CPT | Performed by: FAMILY MEDICINE

## 2020-10-08 NOTE — PROGRESS NOTES
Chief Complaint  Follow-up (LT Knee)      Follow-up left knee pain with MRI documented lateral tibial plateau fracture with chronic trizonal complex tear posterior horn and body medial meniscus with underlying osteoarthritis. Patient here for x-ray check. History of Present Illness:  Colby Hdez is a 45 y.o. 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 Derek Rawls who is being seen today for evaluation of a new acute injury to his left knee. We had seen him about 2-1/2 years ago for underlying left knee chondromalacia and mild underlying osteoarthritis and underwent a successful treatment with Visco supplementation. 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 There was no reports a pop or crack or though he did have immediate pain anteriorly and medially. There is no sense of shifting or pop. He did have pain immediately but opted to continue refereeing the game. He did not develop a great deal of swelling but was quite sore. He states that he is having an achy pain a 2-3 out of 10 but if he attempts to pivot suddenly or undergo a lateral motion will be quite substantial at 8 out of 10. Most of his pain is anterior lateral.  He has having a little medial pain. He states it feels different from his underlying arthritic changes. He may have had a few episodes of catching but does not have oliver locking. He has been taking his anti-inflammatories with diclofenac and has been icing. His symptoms have not improved over the last 3 days. He is being seen today for orthopedic and sports consultation with updated imaging.     It was initially evaluated in the office on 9/21/2020 and is now 6 days out from his knee injury sustained while refereeing a football game. He was quite tender laterally some medially and we did send him for an MRI of his knee. His MRI was obtained at [facility] on 9/23/2020 and did show an acute thankfully nondisplaced nondepressed fracture to the tibial plateau as well as his chronic trizonal complex tear to the posterior horn and body of the medial meniscus which may have propagated somewhat and is now 3 to 4 cm in nature. He did have underlying arthritic changes noted. Tissue swelling was also noted. He is feeling somewhat better since his initial evaluation after starting on his anti-inflammatories and has taken 2 days of the Medrol Dosepak. I sensations of loose bodies locking or catching. Last seen in the office on 9/24/2020 and is now 13 days out from his MRI documented lateral tibial plateau fracture to his left knee. He is doing much better is not having pain is been tolerating his knee immobilizer. His swelling is improved and he is continued with his anti-inflammatory medications. Denies locking catching or true instability. He is out of the remainder football season. 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 Would be able to hold off on repetitive ladder usage and climbing for the first few weeks he believes. Denies locking catching or true instability symptoms. Soraya Hand was last seen in the office on 10/1/2020 and is now 20 days out from his MRI documented lateral tibial plateau fracture to his left knee. He is here for an x-ray check and is no longer having pain on the weight hard time with his knee immobilizer sliding on him. 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 Denies locking catching or true instability or night pain.   He has continued with his diclofenac. Medical History     Patient's medications, allergies, past medical, surgical, social and family histories were reviewed and updated as appropriate. Review of Systems  Pertinent items are noted in HPI  Review of systems reviewed from Patient History Form dated on 9/21/2020 and available in the patient's chart under the Media tab. Vital Signs  Vitals:    10/08/20 1013   Temp: 97.5 °F (36.4 °C)       General Exam:     Constitutional: Patient is adequately groomed with no evidence of malnutrition  DTRs: Deep tendon reflexes are intact  Mental Status: The patient is oriented to time, place and person. The patient's mood and affect are appropriate. Lymphatic: The lymphatic examination bilaterally reveals all areas to be without enlargement or induration. Vascular: Examination reveals no swelling or calf tenderness. Peripheral pulses are palpable and 2+. Neurological: The patient has good coordination. There is no weakness or sensory deficit. Knee Examination  Inspection:  There is no high-grade deformity of the does have trac eknee joint effusion. No patellofemoral crepitation noted. Palpation:  He no longer has clinical tenderness mildly at 0 out of 10 with palpation of the medial joint line. He does have mild tenderness along the lateral femoral condyle and lateral tibial plateau which he rates at 1 out of 10. Mild IT band tenderness. No substantial MCL tenderness proximally or distally. Rang of Motion:  He does have near full extension on the flexion beyond 115 is remarkable for only mild soreness to the posterior and lateral medial knee. Strength:  4+ out of 5 with knee flexion and extension. Some tightness of her hamstrings. Special Tests:  He does have improvements in his tenderness along the anterior and central lateral joint line with gentle attempted Dameon's testing today.   I do not sense high-grade instability only minimal discomfort with patellar grind testing. Skin: There are no rashes, ulcerations or lesions. Distal neurovascular exam is intact. Gait: Improved gait and is using his knee immobilizer. Reflex symmetrically preserved    Additional Comments:     Additional Examinations:  Contralateral Exam: Examination of the right knee reveals intact skin. There is no focal tenderness. The patient demonstrates full painless range of motion with regards to flexion and extension. Strength is 5/5 thorough out all planes. Ligamentous stability is grossly intact. Examination of the bilateral hip reveals intact skin. The patient demonstrates full painless range of motion with regards to flexion, abduction, internal and external rotation. There is not tenderness about the greater trochanter. There is a negative straight leg raise against resistance. Strength is 5/5 thorough out all planes. Right Lower Extremity: Examination of the right lower extremity does not show any tenderness, deformity or injury. Range of motion is unremarkable. There is no gross instability. There are no rashes, ulcerations or lesions. Strength and tone are normal.  Left Lower Extremity: Examination of the left lower extremity does not show any tenderness, deformity or injury. Range of motion is unremarkable. There is no gross instability. There are no rashes, ulcerations or lesions. Strength and tone are normal.      Diagnostic Test Findings: The MRI obtained at [facility] 9/22/2020 as listed above  Narrative    Site: [facility] Rad #: [id number] #: [id number] Procedure: MR Left Knee w/o Contrast ; Reason for Exam: rule out lateral meniscus tear, lateral femoral condyle bruise, MCL pathology, OA, CMP    This document is confidential medical information.  Unauthorized disclosure or use of this information is prohibited by law. If you are not the intended recipient of this document, please advise us by calling immediately [phone].         [facility] performed with a verbal recognition program (DRAGON) and it was checked for errors. It is possible that there are still dictated errors within this office note. If so, please bring any errors to my attention for an addendum. All efforts were made to ensure that this office note is accurate.

## 2020-10-14 ENCOUNTER — OFFICE VISIT (OUTPATIENT)
Dept: ORTHOPEDIC SURGERY | Age: 50
End: 2020-10-14
Payer: COMMERCIAL

## 2020-10-14 VITALS — BODY MASS INDEX: 33.64 KG/M2 | HEIGHT: 70 IN | WEIGHT: 235 LBS

## 2020-10-14 PROCEDURE — L1812 KO ELASTIC W/JOINTS PRE OTS: HCPCS | Performed by: FAMILY MEDICINE

## 2020-10-14 PROCEDURE — 99213 OFFICE O/P EST LOW 20 MIN: CPT | Performed by: FAMILY MEDICINE

## 2020-10-14 NOTE — PROGRESS NOTES
Chief Complaint  Follow-up (LEFT KNEE)      Follow-up left knee pain with MRI documented lateral tibial plateau fracture with chronic trizonal complex tear posterior horn and body medial meniscus with underlying osteoarthritis. Patient here for x-ray check. History of Present Illness:  Lexus Saravia is a 45 y.o. 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 Robert Stevenson who is being seen today for evaluation of a new acute injury to his left knee. We had seen him about 2-1/2 years ago for underlying left knee chondromalacia and mild underlying osteoarthritis and underwent a successful treatment with Visco supplementation. 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 There was no reports a pop or crack or though he did have immediate pain anteriorly and medially. There is no sense of shifting or pop. He did have pain immediately but opted to continue refereeing the game. He did not develop a great deal of swelling but was quite sore. He states that he is having an achy pain a 2-3 out of 10 but if he attempts to pivot suddenly or undergo a lateral motion will be quite substantial at 8 out of 10. Most of his pain is anterior lateral.  He has having a little medial pain. He states it feels different from his underlying arthritic changes. He may have had a few episodes of catching but does not have oliver locking. He has been taking his anti-inflammatories with diclofenac and has been icing. His symptoms have not improved over the last 3 days. He is being seen today for orthopedic and sports consultation with updated imaging.     It was initially evaluated in the office on 9/21/2020 and is now 6 days out from his knee injury sustained while refereeing a football game. He was quite tender laterally some medially and we did send him for an MRI of his knee. His MRI was obtained at [facility] on 9/23/2020 and did show an acute thankfully nondisplaced nondepressed fracture to the tibial plateau as well as his chronic trizonal complex tear to the posterior horn and body of the medial meniscus which may have propagated somewhat and is now 3 to 4 cm in nature. He did have underlying arthritic changes noted. Tissue swelling was also noted. He is feeling somewhat better since his initial evaluation after starting on his anti-inflammatories and has taken 2 days of the Medrol Dosepak. I sensations of loose bodies locking or catching. Last seen in the office on 9/24/2020 and is now 13 days out from his MRI documented lateral tibial plateau fracture to his left knee. He is doing much better is not having pain is been tolerating his knee immobilizer. His swelling is improved and he is continued with his anti-inflammatory medications. Denies locking catching or true instability. He is out of the remainder football season. 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 Would be able to hold off on repetitive ladder usage and climbing for the first few weeks he believes. Denies locking catching or true instability symptoms. Keyon Tariq was last seen in the office on 10/1/2020 and is now 20 days out from his MRI documented lateral tibial plateau fracture to his left knee. He is here for an x-ray check and is no longer having pain on the weight hard time with his knee immobilizer sliding on him. 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 Denies locking catching or true instability or night pain.   He has continued with his diclofenac. Aneta Barron was last seen in the office on 10/8/2020 for x-ray follow-up on his left knee lateral tibial plateau fracture. He is now 26 days out from his injury and is doing much better. He rates his improvement at least 90% and has been compliant with his knee immobilizer. 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 Denies locking catching or true instability symptoms and is clinically doing well. Medical History     Patient's medications, allergies, past medical, surgical, social and family histories were reviewed and updated as appropriate. Review of Systems  Pertinent items are noted in HPI  Review of systems reviewed from Patient History Form dated on 9/21/2020 and available in the patient's chart under the Media tab. Vital Signs  There were no vitals filed for this visit. General Exam:     Constitutional: Patient is adequately groomed with no evidence of malnutrition  DTRs: Deep tendon reflexes are intact  Mental Status: The patient is oriented to time, place and person. The patient's mood and affect are appropriate. Lymphatic: The lymphatic examination bilaterally reveals all areas to be without enlargement or induration. Vascular: Examination reveals no swelling or calf tenderness. Peripheral pulses are palpable and 2+. Neurological: The patient has good coordination. There is no weakness or sensory deficit. Knee Examination  Inspection:  There is no high-grade deformity of the does have trac eknee joint effusion. No patellofemoral crepitation noted. Palpation:  He no longer has clinical tenderness mildly at 0 out of 10 with palpation of the medial joint line. He does have mild tenderness along the lateral femoral condyle and lateral tibial plateau which he rates at 1 out of 10. Mild IT band tenderness. No substantial MCL tenderness proximally or distally.     Rang of Motion:  He does Site: [facility] Rad #: [id number] #: [id number] Procedure: MR Left Knee w/o Contrast ; Reason for Exam: rule out lateral meniscus tear, lateral femoral condyle bruise, MCL pathology, OA, CMP    This document is confidential medical information.  Unauthorized disclosure or use of this information is prohibited by law. If you are not the intended recipient of this document, please advise us by calling immediately [phone].         [address]              Patient Name: Karlos Dodson    Case ID: [id number]    Patient : 1970    Referring Physician: Sharla Can MD    Exam Date: 2020    Exam Description: MR Left Knee w/o Contrast              HISTORY:  Evaluate lateral meniscus tear, lateral femoral condyle bruise, MCL pathology, OA,    CMP.         TECHNICAL FACTORS:  Long- and short-axis fat- and water-weighted images were performed.         COMPARISON:  Prior left knee MRI 2018.         FINDINGS:  ACL, PCL, LCL and MCL are intact.         Edema within the lateral tibia and anterolateral tibia.  Contusion and subcortical fracture    present.  This is new relative to the prior study.         Anterolateral subcutis swelling.  Inflammation also noted anteriorly and medially.  Soft tissue     contusion and sprain present.         Thickened MCL.  Chronic sprain and scarring present.  LCL is intact.         3-4 cm complex trizonal pattern of tearing involves the posterior horn and body of the medial    meniscus.  This has propagated relative to the prior study.  Small parameniscal cyst posterior    horn-body junction measures 4 mm.  Grade 3 chondral irregularity involves the medial femur,    medial tibia, trochlear groove and patellar cartilage.         CONCLUSION:    1. Acute subcortical fracture with surrounding contusion anterolateral tibial plateau.     2. 3-4 cm trizonal tear posterior horn-body junction medial meniscus with foci of parameniscal    cyst measuring 4 mm.  This has propagated relative to the prior study. 3. Anterolateral and less so anteromedial subcutis soft tissue contusion.         Thank you for the opportunity to provide your interpretation.                   Wandy Odom MD         A: MATTHEW/dony 09/23/2020 1:41 PM    T: TK 09/23/2020 12:41 PM      Left knee AP lateral oblique films x2 does show stable appearance to his nondepressed nondisplaced lateral tibial plateau fracture. He is showing early callus formation. Assessment :  #1.  26 days status post weightbearing valgus injury left knee with known history of underlying mild osteoarthritis medial compartment with patellofemoral arthropathy with MRI documented nondisplaced/nondepressed lateral tibial plateau fracture   Impression:  Encounter Diagnoses   Name Primary?  Closed fracture of lateral portion of left tibial plateau, initial encounter Yes    Left knee pain, unspecified chronicity     Primary osteoarthritis of left knee     Old tear of medial meniscus of left knee, unspecified tear type     Contusion of left knee, initial encounter     Chondromalacia patellae of left knee        Office Procedures:  Orders Placed This Encounter   Procedures    XR KNEE LEFT (MIN 4 VIEWS)     Ap, lat, R/L obliques    Ambulatory referral to Physical Therapy     Referral Priority:   Routine     Referral Type:   Eval and Treat     Referral Reason:   Specialty Services Required     Requested Specialty:   Physical Therapy     Number of Visits Requested:   1    Breg Economy Hinged Knee Brace     Patient was prescribed a [employer] Hinged Knee Brace. The left knee will require stabilization / immobilization from this semi-rigid / rigid orthosis to improve their function. The orthosis will assist in protecting the affected area, provide functional support and facilitate healing.     The patient was educated and fit by a healthcare professional with expert

## 2020-10-28 ENCOUNTER — OFFICE VISIT (OUTPATIENT)
Dept: ORTHOPEDIC SURGERY | Age: 50
End: 2020-10-28
Payer: COMMERCIAL

## 2020-10-28 VITALS — HEIGHT: 70 IN | BODY MASS INDEX: 33.64 KG/M2 | WEIGHT: 235 LBS

## 2020-10-28 PROCEDURE — 99213 OFFICE O/P EST LOW 20 MIN: CPT | Performed by: FAMILY MEDICINE

## 2020-10-28 NOTE — LETTER
[address] [place] [address] Dr Aayush Curran [facility] [address]  Phone: [phone]  Fax: [phone]    Nelsy Guy MD        October 28, 2020     Patient: Ayesha Griffith   YOB: 1970   Date of Visit: 10/28/2020       To Whom It May Concern: It is my medical opinion that Micah Nickerson may return to full duty immediately with no restrictions. If you have any questions or concerns, please don't hesitate to call.     Sincerely,        Nelsy Guy MD

## 2020-10-28 NOTE — PROGRESS NOTES
Chief Complaint  Knee Pain (CK LEFT KNEE)      Follow-up left knee pain with MRI documented lateral tibial plateau fracture with chronic trizonal complex tear posterior horn and body medial meniscus with underlying osteoarthritis. Patient here for x-ray check. History of Present Illness:  Umer Varela is a 45 y.o. 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 Irma Desai who is being seen today for evaluation of a new acute injury to his left knee. We had seen him about 2-1/2 years ago for underlying left knee chondromalacia and mild underlying osteoarthritis and underwent a successful treatment with Visco supplementation. 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 There was no reports a pop or crack or though he did have immediate pain anteriorly and medially. There is no sense of shifting or pop. He did have pain immediately but opted to continue refereeing the game. He did not develop a great deal of swelling but was quite sore. He states that he is having an achy pain a 2-3 out of 10 but if he attempts to pivot suddenly or undergo a lateral motion will be quite substantial at 8 out of 10. Most of his pain is anterior lateral.  He has having a little medial pain. He states it feels different from his underlying arthritic changes. He may have had a few episodes of catching but does not have oliver locking. He has been taking his anti-inflammatories with diclofenac and has been icing. His symptoms have not improved over the last 3 days. He is being seen today for orthopedic and sports consultation with updated imaging.     It was initially evaluated in the office on 9/21/2020 and is now 6 days out from his knee injury sustained while refereeing a football game. He was quite tender laterally some medially and we did send him for an MRI of his knee. His MRI was obtained at [facility] on 9/23/2020 and did show an acute thankfully nondisplaced nondepressed fracture to the tibial plateau as well as his chronic trizonal complex tear to the posterior horn and body of the medial meniscus which may have propagated somewhat and is now 3 to 4 cm in nature. He did have underlying arthritic changes noted. Tissue swelling was also noted. He is feeling somewhat better since his initial evaluation after starting on his anti-inflammatories and has taken 2 days of the Medrol Dosepak. I sensations of loose bodies locking or catching. Last seen in the office on 9/24/2020 and is now 13 days out from his MRI documented lateral tibial plateau fracture to his left knee. He is doing much better is not having pain is been tolerating his knee immobilizer. His swelling is improved and he is continued with his anti-inflammatory medications. Denies locking catching or true instability. He is out of the remainder football season. 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 Would be able to hold off on repetitive ladder usage and climbing for the first few weeks he believes. Denies locking catching or true instability symptoms. [address] was last seen in the office on 10/1/2020 and is now 20 days out from his MRI documented lateral tibial plateau fracture to his left knee. He is here for an x-ray check and is no longer having pain on the weight hard time with his knee immobilizer sliding on him. 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 Denies locking catching or true instability or night pain.   He has continued with his diclofenac. Natasha Copeland was last seen in the office on 10/8/2020 for x-ray follow-up on his left knee lateral tibial plateau fracture. He is now 26 days out from his injury and is doing much better. He rates his improvement at least 90% and has been compliant with his knee immobilizer. 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 Denies locking catching or true instability symptoms and is clinically doing well. ALPESH was last seen in the office on 10/14/2020 and is seen back today for 6-week follow-up on his left knee MRI documented lateral tibial plateau fracture. He is doing much better he is 95+ percent improved. 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 He has been working on his quad sets. He has been off of refereeing and denies locking catching or instability symptoms and is continue with his diclofenac. He would like to rehab and get back to running for refereeing as soon as he can. Medical History     Patient's medications, allergies, past medical, surgical, social and family histories were reviewed and updated as appropriate. Review of Systems  Pertinent items are noted in HPI  Review of systems reviewed from Patient History Form dated on 9/21/2020 and available in the patient's chart under the Media tab. Vital Signs  There were no vitals filed for this visit. General Exam:     Constitutional: Patient is adequately groomed with no evidence of malnutrition  DTRs: Deep tendon reflexes are intact  Mental Status: The patient is oriented to time, place and person. The patient's mood and affect are appropriate. Lymphatic: The lymphatic examination bilaterally reveals all areas to be without enlargement or induration. Vascular: Examination reveals no swelling or calf tenderness.   Peripheral pulses are palpable and 2+. Neurological: The patient has good coordination. There is no weakness or sensory deficit. Knee Examination  Inspection:  There is no high-grade deformity of the does have trac eknee joint effusion. No patellofemoral crepitation noted. Palpation:  He no longer has clinical tenderness mildly at 0 out of 10 with palpation of the medial joint line. He no longer has tenderness along the lateral femoral condyle and lateral tibial plateau which he rates at 0 out of 10. No further IT band tenderness. No substantial MCL tenderness proximally or distally. Rang of Motion:  He does have near full extension with flexion improved to 125. No soreness to the tibial plateaus or femoral condyles. Strength:  4+ out of 5 with knee flexion and extension. Some tightness of her hamstrings. Special Tests:  He does have improvements in his tenderness along the anterior and central lateral joint line with gentle attempted Dameon's testing today. I do not sense high-grade instability only minimal discomfort with patellar grind testing. No high-grade click medially from his known chronic medial meniscus tear    Skin: There are no rashes, ulcerations or lesions. Distal neurovascular exam is intact. Gait: Substantially improved gait. Reflex symmetrically preserved    Additional Comments:     Additional Examinations:  Contralateral Exam: Examination of the right knee reveals intact skin. There is no focal tenderness. The patient demonstrates full painless range of motion with regards to flexion and extension. Strength is 5/5 thorough out all planes. Ligamentous stability is grossly intact. Examination of the bilateral hip reveals intact skin. The patient demonstrates full painless range of motion with regards to flexion, abduction, internal and external rotation. There is not tenderness about the greater trochanter. There is a negative straight leg raise against resistance.   Strength is 5/5  contusion and sprain present.         Thickened MCL.  Chronic sprain and scarring present.  LCL is intact.         3-4 cm complex trizonal pattern of tearing involves the posterior horn and body of the medial    meniscus.  This has propagated relative to the prior study.  Small parameniscal cyst posterior    horn-body junction measures 4 mm.  Grade 3 chondral irregularity involves the medial femur,    medial tibia, trochlear groove and patellar cartilage.         CONCLUSION:    1. Acute subcortical fracture with surrounding contusion anterolateral tibial plateau. 2. 3-4 cm trizonal tear posterior horn-body junction medial meniscus with foci of parameniscal    cyst measuring 4 mm.  This has propagated relative to the prior study. 3. Anterolateral and less so anteromedial subcutis soft tissue contusion.         Thank you for the opportunity to provide your interpretation.                   Veda Cason MD         A: MATTHEW/jose luis 09/23/2020 1:41 PM    T: JOSE LUIS 09/23/2020 12:41 PM      Left knee AP lateral oblique films x2 does show stable appearance to his nondepressed nondisplaced lateral tibial plateau fracture. He is showing continued callus formation. Assessment :  #1.  6 weeks status post weightbearing valgus injury left knee with known history of underlying mild osteoarthritis medial compartment with patellofemoral arthropathy with MRI documented nondisplaced/nondepressed lateral tibial plateau fracture   Impression:  Encounter Diagnoses   Name Primary?     Closed fracture of lateral portion of left tibial plateau, initial encounter Yes    Left knee pain, unspecified chronicity     Primary osteoarthritis of left knee     Old tear of medial meniscus of left knee, unspecified tear type     Contusion of left knee, initial encounter     Chondromalacia patellae of left knee        Office Procedures:  Orders Placed This Encounter   Procedures    XR KNEE LEFT (MIN 4 VIEWS)     Standing Status:   Future Number of Occurrences:   1     Standing Expiration Date:   10/28/2021     Scheduling Instructions:      **RIGHT AND LEFT OBLIQUES     Order Specific Question:   Reason for exam:     Answer:   pain    Ambulatory referral to Physical Therapy     Referral Priority:   Routine     Referral Type:   Eval and Treat     Referral Reason:   Specialty Services Required     Requested Specialty:   Physical Therapy     Number of Visits Requested:   1       Treatment Plan:  Treatment options were discussed with Ashlee De La O. We did review his updated plain films recent left knee MRI and exam findings. He is now 6 weeks out from his valgus rotational injury to his left knee. As noted he does have underlying patellofemoral arthropathy with mild medial compartment osteoarthritis as well as a previously known chronic complex trizonal tear posterior horn body medial meniscus but his MRI does show evidence of a nondepressed lateral tibial plateau fracture clearly present on his MRI. With his improvement currently, I would like for him to start aggressive physical therapy with functional advancement probably using the alter G at the [place] location so we can get him running and back into refereeing in preparation for basketball season. He will continue his diclofenac 75 mg 1 pill twice daily we will see him back in the [place] office in about 4 weeks. He will continue with his exercise program icing and activity modification. They will contact us in the interim with questions or concerns. , . This dictation was performed with a verbal recognition program (DRAGON) and it was checked for errors. It is possible that there are still dictated errors within this office note. If so, please bring any errors to my attention for an addendum. All efforts were made to ensure that this office note is accurate.

## 2020-11-04 ENCOUNTER — HOSPITAL ENCOUNTER (OUTPATIENT)
Dept: PHYSICAL THERAPY | Age: 50
Setting detail: THERAPIES SERIES
Discharge: HOME | End: 2020-11-04
Payer: COMMERCIAL

## 2020-11-04 PROCEDURE — 97110 THERAPEUTIC EXERCISES: CPT | Performed by: PHYSICAL THERAPIST

## 2020-11-04 PROCEDURE — 97161 PT EVAL LOW COMPLEX 20 MIN: CPT | Performed by: PHYSICAL THERAPIST

## 2020-11-04 NOTE — FLOWSHEET NOTE
[address]                                                         Date:  2020    Patient Name:  Aparna Jay    :  1970  MRN: [id number]  Restrictions/Precautions:    Medical/Treatment Diagnosis Information:  · Diagnosis: S82.122A (ICD-10-CM) - Closed fracture of lateral portion of left tibial plateau, initial encounter / M17.12 (ICD-10-CM) - Primary osteoarthritis of left knee / M22.42 (ICD-10-CM) - Chondromalacia patellae of left knee  · Treatment Diagnosis: M25.562 (ICD-10-CM) - Left knee pain, unspecified chronicity / S80.02XA (ICD-10-CM) - Contusion of left knee, initial encounter  Insurance/Certification information:  PT Insurance Information: [employer] 30 vcy  Physician Information:  Referring Practitioner: Rodney Coelho  Has the plan of care been signed (Y/N):        []  Yes  [x]  No     Date of Patient follow up with Physician: TBA      Is this a Progress Report:     []  Yes  [x]  No        If Yes:  Date Range for reporting period:  Beginning  Ending    Progress report will be due (10 Rx or 30 days whichever is less): 19       Recertification will be due (POC Duration  / 90 days whichever is less): 20     Precautions/ Contra-indications:     C-SSRS Triggered by Intake questionnaire (Past 2 wk assessment):   [x] No, Questionnaire did not trigger screening.   [] Yes, Patient intake triggered further evaluation      [] C-SSRS Screening completed  [] PCP notified via Plan of Care  [] Emergency services notified     Visit # Insurance Allowable Auth Required    []  Yes [x]  No        Functional Scale: LEFS=7.5% deficit   Date assessed:  20       Pain level:  0/10     SUBJECTIVE:  See eval    OBJECTIVE: See eval   Observation:    Test measurements:      Flexibility L R Comment   Hamstring      Gastroc      ITB      Quad                ROM PROM AROM Overpressure Comment    L R L R L R    Knee Flexion          Knee Extension          Hip Flexion          Hip Abd          Hip Ext           Hip IR          Hip ER              Strength L R Comment   Quad      Hamstring      Gastroc      Hip flexor      Hip ABD      Hip Ext      Hip IR      Hip ER          Girth L R   Mid Patella     Suprapatellar     5cm above     15cm above         Orthopedic Special Tests:   Special Test Results/Comment   Meniscal Click    Crepitus    Flexion Test    Valgus Laxity    Varus Laxity    Lachmans    Drop Back    Ana Cristina Passe          RESTRICTIONS/PRECAUTIONS: none    Exercises/Interventions:     Therapeutic Ex (97214) Position Sets/sec Reps Notes/CUES   Stretching       Hamstring  30 5    Hip Flexion       ITB  30 5    Groin       Quad       Inclined Calf- Gastroc       Inclined Calf-Soleus       Towel pull- Calf       Piriformis       Figure 4 push        Hip Adductor              Isometrics       Quad Sets  10 10    Glut Sets       Hip Abduction       Hamstring       Hip Flexion  10 10    Hip Adduction- BS              SLR       Supine Flexion  3 8    Prone Extension  3 8    SL Adduction       SL Abduction       SLR +        SLR ABC's       Clams  3 8 5#   Reverse Clams              ROM       Sheet Pulls       Wall Slides @ EOB 30 5    Edge of Bed       ERMI - Flexionator       ERMI- Extensionator       Weighted Hangs       Ankle Pumps       E-Z Stretch              PRE's       Leg Press- Range: 70 - 5        Leg Extension- Range: 90 - 40        Leg Curl- Range: 0 -90              CCTKE- Cable Column       CCTKE- Prone on table              CKC       Calf Raises       Wall Sits       Mini Squats       Lateral Band Walks                                                 Scifit Bike Time:   Level:   Program:   Seat:       AD- Bike Time:   RPM's:   Seat:      Alter G Treadmill Time:      Short Size:    Treadmill       Elliptical              NMR re-education (04394)    CUES NEEDED   Biodex-balance       Single leg stance       Plyoback       Rocker Board       SL Deadlifts              Planks- front       Planks- Side                                                        Therapeutic Activity (05787)       Step Ups       Step up and overs       Lateral Step downs       Stool Scoots                     Manual Intervention (02656)       Patellar Glides       Medial        Lateral        Superior       Inferior                     Exercises   · Seated Table Hamstring Stretch - 5 reps - 1 sets - 30 hold - 1x daily - 7x weekly   · Supine ITB Stretch with Strap - 5 reps - 1 sets - 30 hold - 1x daily - 7x weekly   · Long Sitting Quad Set - 10 reps - 1 sets - 10 hold - 1x daily - 7x weekly   · Beginner Side Leg Lift - 10 reps - 3 sets - 1x daily - 7x weekly   · Supine Active Straight Leg Raise - 10 reps - 3 sets - 1x daily - 7x weekly   · Beginner Clam - 10 reps - 3 sets - 1x daily - 7x weekly   · Full Leg Press with Resistance Around Knees - 5 reps - 1 sets - 30 hold - 1x daily - 7x weekly      Therapeutic Exercise and NMR EXR  [] (87743) Provided verbal/tactile cueing for activities related to strengthening, flexibility, endurance, ROM for improvements in LE, proximal hip, and core control with self care, mobility, lifting, ambulation.  [] (09475) Provided verbal/tactile cueing for activities related to improving balance, coordination, kinesthetic sense, posture, motor skill, proprioception  to assist with LE, proximal hip, and core control in self care, mobility, lifting, ambulation and eccentric single leg control.      NMR and Therapeutic Activities:    [] (60729 or 50522) Provided verbal/tactile cueing for activities related to improving balance, coordination, kinesthetic sense, posture, motor skill, proprioception and motor activation to allow for proper function of core, proximal hip and LE with self care and ADLs  [] (03846) Gait Re-education- Provided training and instruction to the patient for proper LE, core and proximal hip recruitment and positioning and eccentric body weight control with ambulation re-education including up and down stairs     Home Exercise Program:    [x] (41145) Reviewed/Progressed HEP activities related to strengthening, flexibility, endurance, ROM of core, proximal hip and LE for functional self-care, mobility, lifting and ambulation/stair navigation   [] (42840)Reviewed/Progressed HEP activities related to improving balance, coordination, kinesthetic sense, posture, motor skill, proprioception of core, proximal hip and LE for self care, mobility, lifting, and ambulation/stair navigation      Manual Treatments:  PROM / STM / Oscillations-Mobs:  G-I, II, III, IV (PA's, Inf., Post.)  [] (53747) Provided manual therapy to mobilize LE, proximal hip and/or LS spine soft tissue/joints for the purpose of modulating pain, promoting relaxation,  increasing ROM, reducing/eliminating soft tissue swelling/inflammation/restriction, improving soft tissue extensibility and allowing for proper ROM for normal function with self care, mobility, lifting and ambulation. Modalities:     [] GAME READY (VASO)- for significant edema, swelling, pain control. Charges:  Timed Code Treatment Minutes: 18   Total Treatment Minutes: 60      [x] EVAL (LOW) 59923 (typically 20 minutes face-to-face)  [] EVAL (MOD) 93627 (typically 30 minutes face-to-face)  [] EVAL (HIGH) 86220 (typically 45 minutes face-to-face)  [] RE-EVAL     [x] NL(44442) x   1  [] IONTO  [] NMR (84581) x     [] VASO  [] Manual (36222) x     [] Other:  [] TA x      [] Mech Traction (99716)  [] ES(attended) (87691)     [] ES (un) (69147    ASSESSMENT:  See eval      GOALS:     Patient stated goal: increase mmt and running    [] Progressing: [] Met: [] Not Met: [] Adjusted    Therapist goals for Patient:   Short Term Goals: To be achieved in: 2 weeks  1. Independent in HEP and progression per patient tolerance, in order to prevent re-injury.      [] Progressing: [] complications  [] Other:     Return to Play: (if applicable)   []  Stage 1: Intro to Strength   []  Stage 2: Return to Run and Strength   []  Stage 3: Return to Jump and Strength   []  Stage 4: Dynamic Strength and Agility   []  Stage 5: Sport Specific Training     []  Ready to Return to Play, Meets All Above Stages   []  Not Ready for Return to Sports   Comments:                            PLAN: See eval  [] Continue per plan of care [] Alter current plan (see comments above)  [x] Plan of care initiated [] Hold pending MD visit [] Discharge      Electronically signed by:  Anisa Valladares PT      Note: If patient does not return for scheduled/ recommended follow up visits, this note will serve as a discharge from care along with most recent update on progress.

## 2020-11-04 NOTE — PLAN OF CARE
[address] [facility],[address], [address] [place], [address]     Phone: [phone]   Fax: [phone]                                                        Physical Therapy Daily Treatment Note  DDate:  2020    Patient Name:  Ashlee De La O    :  1970  MRN: [id number]  Restrictions/Precautions:    Medical/Treatment Diagnosis Information:  · Diagnosis: S82.122A (ICD-10-CM) - Closed fracture of lateral portion of left tibial plateau, initial encounter / M17.12 (ICD-10-CM) - Primary osteoarthritis of left knee / M22.42 (ICD-10-CM) - Chondromalacia patellae of left knee  · Treatment Diagnosis: M25.562 (ICD-10-CM) - Left knee pain, unspecified chronicity / S80.02XA (ICD-10-CM) - Contusion of left knee, initial encounter  Insurance/Certification information:  PT Insurance Information: [employer] 30 vcy  Physician Information:  Referring Practitioner: April Bonds  Has the plan of care been signed (Y/N):        []  Yes  [x]  No     Date of Patient follow up with Physician: TBA      Is this a Progress Report:     []  Yes  [x]  No        If Yes:  Date Range for reporting period:  Beginning  Ending    Progress report will be due (10 Rx or 30 days whichever is less):  539       Recertification will be due (POC Duration  / 90 days whichever is less): 1220        Precautions/ Contra-indications:     C-SSRS Triggered by Intake questionnaire (Past 2 wk assessment):   [x] No, Questionnaire did not trigger screening.   [] Yes, Patient intake triggered further evaluation      [] C-SSRS Screening completed  [] PCP notified via Plan of Care  [] Emergency services notified       SUBJECTIVE: Patient stated complaint: Taken from MD intake; Ashlee De La O is a 45 y.o. 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 Alessia Veliz who is being seen today for evaluation of a new acute injury to his left knee. We had seen him about 2-1/2 years ago for underlying left knee chondromalacia and mild underlying osteoarthritis and underwent a successful treatment with Visco supplementation. 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 There was no reports a pop or crack or though he did have immediate pain anteriorly and medially. There is no sense of shifting or pop. He did have pain immediately but opted to continue refereeing the game. He did not develop a great deal of swelling but was quite sore. He states that he is having an achy pain a 2-3 out of 10 but if he attempts to pivot suddenly or undergo a lateral motion will be quite substantial at 8 out of 10. Most of his pain is anterior lateral.  He has having a little medial pain. He states it feels different from his underlying arthritic changes. He may have had a few episodes of catching but does not have oliver locking. He has been taking his anti-inflammatories with diclofenac and has been icing. His symptoms have not improved over the last 3 days. He is being seen today for orthopedic and sports consultation with updated imaging. I was last seen in the office on 10/14/2020 and is seen back today for 6-week follow-up on his left knee MRI documented lateral tibial plateau fracture. He is doing much better he is 95+ percent improved. 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 He has been working on his quad sets. He has been off of refereeing and denies locking catching or instability symptoms and is continue with his diclofenac. He would like to rehab and get back to running for refereeing as soon as he can.     Current LOF: weakness and RTS function official     Relevant Medical History: + see enclosed     Functional Scale: LEFS= 7.5%   Date assessed:  11-4-20     Pain Scale:   Best= 0/10  Worst within last 24 hours=  0/10    Easing factors: rest and ice  Provocative factors: nothing at time      Type: []Constant   [x]Intermittent  []Radiating []Localized []other:     Numbness/Tingling: none    Functional Limitations/Impairments: []Sitting []Standing []Walking    []Squatting/bending  []Stairs           []ADL's  []Transfers [x]Sports/Recreation []Other:    Occupation/School: construction / referee     Living Status/Prior Level of Function: Independent with ADLs   (insert highest prior level of function)    OBJECTIVE:   · Test measurements:  See eval    Palpation Scale- Mcwilliams and Berkoff- (Grade 0-4)     Description X / -- Comments   Grade 0 No tenderness x    Grade 1 Mild tenderness without grimace or flinch     Grade 2  Moderate tenderness plus grimace or flinch     Grade 3  Severe tenderness plus marked flinch or withdrawal     Grade 4 Unbearable tenderness; patient withdrawals with light touch      DR Poppy, Roro Dhillon GM. Myofascial trigger points show spontaneous needle emg activity. Spine 1993; 18 :[id number].        Flexibility L R Comment   Hamstring poor     Gastroc fair     ITB poor     Quad              Low Quad tone (VMO)   ROM PROM AROM Overpressure Comment    L R L R L R    Knee Flexion   123 125      Knee Extension   0 0      Hip Flexion          Hip Abd          Hip Ext           Hip IR          Hip ER              Strength L R Comment   Quad 4+ 5    Hamstring 4+ 5    Gastroc      Hip flexor      Hip ABD      Hip Ext      Hip IR      Hip ER        No significant swelling   Girth L R   Mid Patella     Suprapatellar     5cm above     15cm above         Orthopedic Special Tests:   Special Test Results/Comment   Meniscal Click    Crepitus mild   Flexion Test    Valgus Laxity    Varus Laxity    Lachmans    Drop Back    Homans Obers =           Joint mobility:    [x]Normal    []Hypo   []Hyper    Palpation: no significant TTP     Functional Mobility/Transfers: I    Posture: FF head with     Bandages/Dressings/Incisions: n/a    Gait: (include devices/WB status) WBAT- walking pace no deficits. [x] Patient history, allergies, meds reviewed. Medical chart reviewed. See intake form. Review Of Systems (ROS):  [x]Performed Review of systems (Integumentary, CardioPulmonary, Neurological) by intake and observation. Intake form has been scanned into medical record. Patient has been instructed to contact their primary care physician regarding ROS issues if not already being addressed at this time.       Co-morbidities/Complexities (which will affect course of rehabilitation):   []None           Arthritic conditions   []Rheumatoid arthritis (M05.9)  [x]Osteoarthritis (M19.91)   Cardiovascular conditions   []Hypertension (I10)  []Hyperlipidemia (E78.5)  []Angina pectoris (I20)  []Atherosclerosis (I70)   Musculoskeletal conditions   []Disc pathology   []Congenital spine pathologies   []Prior surgical intervention  []Osteoporosis (M81.8)  []Osteopenia (M85.8)   Endocrine conditions   []Hypothyroid (E03.9)  []Hyperthyroid Gastrointestinal conditions   []Constipation (O10.68)   Metabolic conditions   []Morbid obesity (E66.01)  []Diabetes type 1(E10.65) or 2 (E11.65)   []Neuropathy (G60.9)     Pulmonary conditions   []Asthma (J45)  []Coughing   []COPD (J44.9)   Psychological Disorders  []Anxiety (F41.9)  []Depression (F32.9)   []Other:   []Other:          Barriers to/and or personal factors that will affect rehab potential:              []Age  []Sex              []Motivation/Lack of Motivation                        []Co-Morbidities              []Cognitive Function, education/learning barriers              []Environmental, home barriers              [x]profession/work barriers  []past PT/medical patella-femoral syndrome   [x]Signs/symptoms consistent with knee OA/hip OA   [x]Signs/symptoms consistent with internal derangement of knee/Hip   []Signs/symptoms consistent with functional hip weakness/NMR control      []Signs/symptoms consistent with tendinitis/tendinosis    []signs/symptoms consistent with pathology which may benefit from Dry needling      []other:  :      Prognosis/Rehab Potential:      []Excellent   [x]Good    []Fair   []Poor    Tolerance of evaluation/treatment:    []Excellent   [x]Good    []Fair   []Poor    PLAN  Frequency/Duration:  1 to 2 days per week for 4 Weeks:  Interventions:  [x]  Therapeutic exercise including: strength training, ROM, for Lower extremity and core   [x]  NMR activation and proprioception for LE, Glutes and Core   [x]  Manual therapy as indicated for LE, Hip and spine to include: Dry Needling/IASTM, STM, PROM, Gr I-IV mobilizations, manipulation. [x] Modalities as needed that may include: thermal agents, E-stim, Biofeedback, US, iontophoresis as indicated  [x] Patient education on joint protection, postural re-education, activity modification, progression of HEP. HEP instruction: (see scanned forms)  Access Code: [email or web address]   URL: [email or web address]   Date: 11/04/2020   Prepared by: Keegan Amaya     Exercises   Seated Table Hamstring Stretch - 5 reps - 1 sets - 30 hold - 1x daily - 7x weekly   Supine ITB Stretch with Strap - 5 reps - 1 sets - 30 hold - 1x daily - 7x weekly   Long Sitting Quad Set - 10 reps - 1 sets - 10 hold - 1x daily - 7x weekly   Beginner Side Leg Lift - 10 reps - 3 sets - 1x daily - 7x weekly   Supine Active Straight Leg Raise - 10 reps - 3 sets - 1x daily - 7x weekly   Beginner Clam - 10 reps - 3 sets - 1x daily - 7x weekly   Full Leg Press with Resistance Around Knees - 5 reps - 1 sets - 30 hold - 1x daily - 7x weekly   GOALS:     Patient stated goal: increase mmt and running    [] Progressing: [] Met: [] Not Met: [] Adjusted    Therapist goals for Patient:   Short Term Goals: To be achieved in: 2 weeks  1. Independent in HEP and progression per patient tolerance, in order to prevent re-injury. [] Progressing: [] Met: [] Not Met: [] Adjusted     2. Patient will have a decrease in pain to facilitate improvement in movement, function, and ADLs as indicated by Functional Deficits. [] Progressing: [] Met: [] Not Met: [] Adjusted    Long Term Goals: To be achieved in: 4 weeks  1. Disability index score of 50% or less for the  / LEFS to assist with reaching prior level of function. [] Progressing: [] Met: [] Not Met: [] Adjusted    2. Patient will demonstrate increased AROM to LLE to allow for proper joint functioning as indicated by patients Functional Deficits. [] Progressing: [] Met: [] Not Met: [] Adjusted    3. Patient will demonstrate an increase in Strength to good proximal hip strength and control, within 5lb HHD in LE to allow for proper functional mobility as indicated by patients Functional Deficits. [] Progressing: [] Met: [] Not Met: [] Adjusted    4. Patient will return to I functional activities without increased symptoms or restriction. [] Progressing: [] Met: [] Not Met: [] Adjusted    5. 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 [] Progression is slowed due to complexities/Impairments listed. [] Progression has been slowed due to co-morbidities.   [x] Plan just implemented, too soon to assess goals progression <30days   [] Goals require adjustment due to lack of progress      Physical Therapy Evaluation Complexity Justification  [x] A history of present problem with:  [] no personal factors and/or comorbidities that impact the plan of care;  [x]1-2 personal factors and/or comorbidities that impact the plan of care  []3 personal factors and/or comorbidities that impact the plan of care  [x] An examination of body systems using standardized tests and measures addressing any of the following: body structures and functions (impairments), activity limitations, and/or participation restrictions;:  [] a total of 1-2 or more elements   [x] a total of 3 or more elements   [] a total of 4 or more elements   [x] A clinical presentation with:  [x] stable and/or uncomplicated characteristics   [] evolving clinical presentation with changing characteristics  [] unstable and unpredictable characteristics;   [x] Clinical decision making of [x] low, [] moderate, [] high complexity using standardized patient assessment instrument and/or measurable assessment of functional outcome. [x] EVAL (LOW) 78186 (typically 20 minutes face-to-face)  [] EVAL (MOD) 04896 (typically 30 minutes face-to-face)  [] EVAL (HIGH) 56728 (typically 45 minutes face-to-face)  [] RE-EVAL 03234    Electronically signed by:  Aaron Echols, PT, MPT, cert.  DN, OMT-C

## 2020-11-11 ENCOUNTER — HOSPITAL ENCOUNTER (OUTPATIENT)
Dept: PHYSICAL THERAPY | Age: 50
Setting detail: THERAPIES SERIES
Discharge: HOME | End: 2020-11-11
Payer: COMMERCIAL

## 2020-11-11 PROCEDURE — 97112 NEUROMUSCULAR REEDUCATION: CPT | Performed by: PHYSICAL THERAPIST

## 2020-11-11 PROCEDURE — 97110 THERAPEUTIC EXERCISES: CPT | Performed by: PHYSICAL THERAPIST

## 2020-11-11 NOTE — FLOWSHEET NOTE
 Test measurements:      Flexibility L R Comment   Hamstring      Gastroc      ITB      Quad                ROM PROM AROM Overpressure Comment    L R L R L R    Knee Flexion          Knee Extension          Hip Flexion          Hip Abd          Hip Ext           Hip IR          Hip ER              Strength L R Comment   Quad      Hamstring      Gastroc      Hip flexor      Hip ABD      Hip Ext      Hip IR      Hip ER          Girth L R   Mid Patella     Suprapatellar     5cm above     15cm above         Orthopedic Special Tests:   Special Test Results/Comment   Meniscal Click    Crepitus    Flexion Test    Valgus Laxity    Varus Laxity    Lachmans    Drop Back    [place] Justice          RESTRICTIONS/PRECAUTIONS: none    Exercises/Interventions:     Therapeutic Ex (96437) Position Sets/sec Reps Notes/CUES   Stretching       Hamstring  30 5    Hip Flexion       ITB  30 5    Groin       Quad       Inclined Calf- Gastroc  30 5    Inclined Calf-Soleus  30 5    Towel pull- Calf       Piriformis       Figure 4 push        Hip Adductor              Isometrics       Quad Sets  HEP   Glut Sets       Hip Abduction       Hamstring       Hip Flexion       Hip Adduction- BS  dc          SLR       Supine Flexion  3 10 2#   Prone Extension  3 10 2#   SL Adduction  3 10 2#   SL Abduction  3 10 2#   SLR +        SLR ABC's 1 rep      Clams  3 10 5#   Reverse Clams              ROM       Sheet Pulls       Wall Slides    Edge of Bed       ERMI - Flexionator       ERMI- Extensionator       Weighted Hangs       Ankle Pumps       E-Z Stretch              PRE's       Leg Press- Range: 70 - 5  120 10 3    Leg Extension- Range: 90 - 40        Leg Curl- Range: 0 -90 NPV 10 3           CCTKE- Cable Column 6 plates  10 3 Band for home    CCTKE- Prone on table              CKC       Calf Raises       Wall Sits       Mini Squats       Lateral Band Walks                                                 Scifit Bike Time:   Level:   Program: Seat:       AD- Bike Time:   RPM's:   Seat:      Alter G Treadmill Time:      Short Size:    Treadmill       Elliptical              NMR re-education (13839)    CUES NEEDED   Biodex-balance L= 7   LOS x 3   SLS for HEP   Single leg stance       Plyoback       Rocker Board       SL Deadlifts              Planks- front       Planks- Side                                                        Therapeutic Activity (50969)       Step Ups       Step up and overs       Lateral Step downs       Stool Scoots                     Manual Intervention (69521)       Patellar Glides       Medial        Lateral        Superior       Inferior                     Exercises   · Seated Table Hamstring Stretch - 5 reps - 1 sets - 30 hold - 1x daily - 7x weekly   · Supine ITB Stretch with Strap - 5 reps - 1 sets - 30 hold - 1x daily - 7x weekly   · Long Sitting Quad Set - 10 reps - 1 sets - 10 hold - 1x daily - 7x weekly   · Beginner Side Leg Lift - 10 reps - 3 sets - 1x daily - 7x weekly   · Supine Active Straight Leg Raise - 10 reps - 3 sets - 1x daily - 7x weekly   · Beginner Clam - 10 reps - 3 sets - 1x daily - 7x weekly   · Full Leg Press with Resistance Around Knees - 5 reps - 1 sets - 30 hold - 1x daily - 7x weekly      Therapeutic Exercise and NMR EXR  [x] (06978) Provided verbal/tactile cueing for activities related to strengthening, flexibility, endurance, ROM for improvements in LE, proximal hip, and core control with self care, mobility, lifting, ambulation.  [] (74029) Provided verbal/tactile cueing for activities related to improving balance, coordination, kinesthetic sense, posture, motor skill, proprioception  to assist with LE, proximal hip, and core control in self care, mobility, lifting, ambulation and eccentric single leg control.      NMR and Therapeutic Activities:    [x] (54797 or 96432) Provided verbal/tactile cueing for activities related to improving balance, coordination, kinesthetic sense, posture, motor skill, proprioception and motor activation to allow for proper function of core, proximal hip and LE with self care and ADLs  [] (03199) Gait Re-education- Provided training and instruction to the patient for proper LE, core and proximal hip recruitment and positioning and eccentric body weight control with ambulation re-education including up and down stairs     Home Exercise Program:    [x] (95733) Reviewed/Progressed HEP activities related to strengthening, flexibility, endurance, ROM of core, proximal hip and LE for functional self-care, mobility, lifting and ambulation/stair navigation   [] (98507)Reviewed/Progressed HEP activities related to improving balance, coordination, kinesthetic sense, posture, motor skill, proprioception of core, proximal hip and LE for self care, mobility, lifting, and ambulation/stair navigation      Manual Treatments:  PROM / STM / Oscillations-Mobs:  G-I, II, III, IV (PA's, Inf., Post.)  [] (40048) Provided manual therapy to mobilize LE, proximal hip and/or LS spine soft tissue/joints for the purpose of modulating pain, promoting relaxation,  increasing ROM, reducing/eliminating soft tissue swelling/inflammation/restriction, improving soft tissue extensibility and allowing for proper ROM for normal function with self care, mobility, lifting and ambulation. Modalities:     [] GAME READY (VASO)- for significant edema, swelling, pain control.      Charges:  Timed Code Treatment Minutes: 55   Total Treatment Minutes: 60      [] EVAL (LOW) 68713 (typically 20 minutes face-to-face)  [] EVAL (MOD) 68243 (typically 30 minutes face-to-face)  [] EVAL (HIGH) 27095 (typically 45 minutes face-to-face)  [] RE-EVAL     [x] WW(74179) x  3  [] IONTO  [x] NMR (97456) x   1  [] VASO  [] Manual (20298) x     [] Other:  [] TA x      [] Mech Traction (40819)  [] ES(attended) (13745)     [] ES (un) (14485    ASSESSMENT:  See eval      GOALS:     Patient stated goal: increase mmt and running    [] Progressing: [] Met: [] Not Met: [] Adjusted    Therapist goals for Patient:   Short Term Goals: To be achieved in: 2 weeks  1. Independent in HEP and progression per patient tolerance, in order to prevent re-injury. [] Progressing: [] Met: [] Not Met: [] Adjusted     2. Patient will have a decrease in pain to facilitate improvement in movement, function, and ADLs as indicated by Functional Deficits. [] Progressing: [] Met: [] Not Met: [] Adjusted    Long Term Goals: To be achieved in: 4 weeks  1. Disability index score of 50% or less for the  / LEFS to assist with reaching prior level of function. [] Progressing: [] Met: [] Not Met: [] Adjusted    2. Patient will demonstrate increased AROM to LLE to allow for proper joint functioning as indicated by patients Functional Deficits. [] Progressing: [] Met: [] Not Met: [] Adjusted    3. Patient will demonstrate an increase in Strength to good proximal hip strength and control, within 5lb HHD in LE to allow for proper functional mobility as indicated by patients Functional Deficits. [] Progressing: [] Met: [] Not Met: [] Adjusted    4. Patient will return to I functional activities without increased symptoms or restriction. [] Progressing: [] Met: [] Not Met: [] Adjusted    5. 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 [] Progression is slowed due to complexities/Impairments listed. [] Progression has been slowed due to co-morbidities.   [x] Plan just implemented, too soon to assess goals progression <30days   [] Goals require adjustment due to lack of progress  [] Patient is not progressing as expected and requires additional follow up with physician  [] Other    Prognosis for POC: [x] Good [] Fair  [] Poor      Patient requires continued skilled intervention: [x] Yes  [] No    Treatment/Activity Tolerance:  [x] Patient able to complete treatment  [] Patient limited by fatigue  [] Patient limited by pain     [] Patient limited by other medical complications  [] Other: Guy tx well. Moderate fatigue by end of session. Return to Play: (if applicable)   []  Stage 1: Intro to Strength   []  Stage 2: Return to Run and Strength   []  Stage 3: Return to Jump and Strength   []  Stage 4: Dynamic Strength and Agility   []  Stage 5: Sport Specific Training     []  Ready to Return to Play, Meets All Above Stages   []  Not Ready for Return to Sports   Comments:                            PLAN: Consider alter G npv  [x] Continue per plan of care [] Alter current plan (see comments above)  [] Plan of care initiated [] Hold pending MD visit [] Discharge      Electronically signed by:  Lowell Salazar PT      Note: If patient does not return for scheduled/ recommended follow up visits, this note will serve as a discharge from care along with most recent update on progress.

## 2020-11-18 ENCOUNTER — APPOINTMENT (OUTPATIENT)
Dept: PHYSICAL THERAPY | Age: 50
End: 2020-11-18
Payer: COMMERCIAL

## 2020-11-19 ENCOUNTER — OFFICE VISIT (OUTPATIENT)
Dept: ORTHOPEDIC SURGERY | Age: 50
End: 2020-11-19
Payer: COMMERCIAL

## 2020-11-19 VITALS — BODY MASS INDEX: 33.6 KG/M2 | TEMPERATURE: 97.7 F | WEIGHT: 240 LBS | HEIGHT: 71 IN

## 2020-11-19 PROCEDURE — 99213 OFFICE O/P EST LOW 20 MIN: CPT | Performed by: FAMILY MEDICINE

## 2020-11-19 NOTE — PROGRESS NOTES
Chief Complaint  Follow-up (LT Knee)      Follow-up left knee pain with MRI documented lateral tibial plateau fracture with chronic trizonal complex tear posterior horn and body medial meniscus with underlying osteoarthritis. History of Present Illness:  Praveen Reynoso is a 45 y.o. 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 Abdullahi Simmons who is being seen today for evaluation of a new acute injury to his left knee. We had seen him about 2-1/2 years ago for underlying left knee chondromalacia and mild underlying osteoarthritis and underwent a successful treatment with Visco supplementation. 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 There was no reports a pop or crack or though he did have immediate pain anteriorly and medially. There is no sense of shifting or pop. He did have pain immediately but opted to continue refereeing the game. He did not develop a great deal of swelling but was quite sore. He states that he is having an achy pain a 2-3 out of 10 but if he attempts to pivot suddenly or undergo a lateral motion will be quite substantial at 8 out of 10. Most of his pain is anterior lateral.  He has having a little medial pain. He states it feels different from his underlying arthritic changes. He may have had a few episodes of catching but does not have oliver locking. He has been taking his anti-inflammatories with diclofenac and has been icing. His symptoms have not improved over the last 3 days. He is being seen today for orthopedic and sports consultation with updated imaging.     It was initially evaluated in the office on 9/21/2020 and is now 6 days out from his knee injury sustained while refereeing a football game. He was quite tender laterally some medially and we did send him for an MRI of his knee. His MRI was obtained at [facility] on 9/23/2020 and did show an acute thankfully nondisplaced nondepressed fracture to the tibial plateau as well as his chronic trizonal complex tear to the posterior horn and body of the medial meniscus which may have propagated somewhat and is now 3 to 4 cm in nature. He did have underlying arthritic changes noted. Tissue swelling was also noted. He is feeling somewhat better since his initial evaluation after starting on his anti-inflammatories and has taken 2 days of the Medrol Dosepak. I sensations of loose bodies locking or catching. Last seen in the office on 9/24/2020 and is now 13 days out from his MRI documented lateral tibial plateau fracture to his left knee. He is doing much better is not having pain is been tolerating his knee immobilizer. His swelling is improved and he is continued with his anti-inflammatory medications. Denies locking catching or true instability. He is out of the remainder football season. 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 Would be able to hold off on repetitive ladder usage and climbing for the first few weeks he believes. Denies locking catching or true instability symptoms. [address] was last seen in the office on 10/1/2020 and is now 20 days out from his MRI documented lateral tibial plateau fracture to his left knee. He is here for an x-ray check and is no longer having pain on the weight hard time with his knee immobilizer sliding on him. 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 Denies locking catching or true instability or night pain. He has continued with his diclofenac.     [address] was last seen in the office on 10/8/2020 for x-ray follow-up on his left knee lateral tibial plateau fracture. He is now 26 days out from his injury and is doing much better. He rates his improvement at least 90% and has been compliant with his knee immobilizer. 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 Denies locking catching or true instability symptoms and is clinically doing well. I was last seen in the office on 10/14/2020 and is seen back today for 6-week follow-up on his left knee MRI documented lateral tibial plateau fracture. He is doing much better he is 95+ percent improved. 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 He has been working on his quad sets. He has been off of refereeing and denies locking catching or instability symptoms and is continue with his diclofenac. He would like to rehab and get back to running for refereeing as soon as he can. Norm Billy was last seen in the office on 10/28/2020 and was continued on treatment for his left knee MRI documented lateral tibial plateau fracture with aggravation of underlying osteoarthritis and chronic trizonal complex tear posterior horn body medial meniscus. he presents back today stating that he is doing outstanding. He has been able to perform his normal work activities as a union plaster and has had 3 sessions of therapy thus far but is yet to get on the alter G. He is in a great hurry to get back into basketball refereeing and certainly would like to be back for baseball umpiring. He is reestablishing his motion and strength is good with his home-based exercises and has continued with his diclofenac. Denies locking catching or true instability symptoms.     Medical History     Patient's medications, allergies, past medical, surgical, social and family histories were Distal neurovascular exam is intact. Gait: Substantially improved gait. Reflex symmetrically preserved    Additional Comments:     Additional Examinations:  Contralateral Exam: Examination of the right knee reveals intact skin. There is no focal tenderness. The patient demonstrates full painless range of motion with regards to flexion and extension. Strength is 5/5 thorough out all planes. Ligamentous stability is grossly intact. Examination of the bilateral hip reveals intact skin. The patient demonstrates full painless range of motion with regards to flexion, abduction, internal and external rotation. There is not tenderness about the greater trochanter. There is a negative straight leg raise against resistance. Strength is 5/5 thorough out all planes. Right Lower Extremity: Examination of the right lower extremity does not show any tenderness, deformity or injury. Range of motion is unremarkable. There is no gross instability. There are no rashes, ulcerations or lesions. Strength and tone are normal.  Left Lower Extremity: Examination of the left lower extremity does not show any tenderness, deformity or injury. Range of motion is unremarkable. There is no gross instability. There are no rashes, ulcerations or lesions. Strength and tone are normal.      Diagnostic Test Findings: The MRI obtained at [facility] 9/22/2020 as listed above  Narrative    Site: [facility] Rad #: [id number] #: [id number] Procedure: MR Left Knee w/o Contrast ; Reason for Exam: rule out lateral meniscus tear, lateral femoral condyle bruise, MCL pathology, OA, CMP    This document is confidential medical information.  Unauthorized disclosure or use of this information is prohibited by law. If you are not the intended recipient of this document, please advise us by calling immediately [phone].         [facility] - [address] [place] [address]              Patient Name: Natalie Miller Marlen    Case ID: [id number]    Patient : 1970    Referring Physician: Sharla Can MD    Exam Date: 2020    Exam Description: MR Left Knee w/o Contrast              HISTORY:  Evaluate lateral meniscus tear, lateral femoral condyle bruise, MCL pathology, OA,    CMP.         TECHNICAL FACTORS:  Long- and short-axis fat- and water-weighted images were performed.         COMPARISON:  Prior left knee MRI 2018.         FINDINGS:  ACL, PCL, LCL and MCL are intact.         Edema within the lateral tibia and anterolateral tibia.  Contusion and subcortical fracture    present.  This is new relative to the prior study.         Anterolateral subcutis swelling.  Inflammation also noted anteriorly and medially.  Soft tissue     contusion and sprain present.         Thickened MCL.  Chronic sprain and scarring present.  LCL is intact.         3-4 cm complex trizonal pattern of tearing involves the posterior horn and body of the medial    meniscus.  This has propagated relative to the prior study.  Small parameniscal cyst posterior    horn-body junction measures 4 mm.  Grade 3 chondral irregularity involves the medial femur,    medial tibia, trochlear groove and patellar cartilage.         CONCLUSION:    1. Acute subcortical fracture with surrounding contusion anterolateral tibial plateau. 2. 3-4 cm trizonal tear posterior horn-body junction medial meniscus with foci of parameniscal    cyst measuring 4 mm.  This has propagated relative to the prior study.     3. Anterolateral and less so anteromedial subcutis soft tissue contusion.         Thank you for the opportunity to provide your interpretation.                   Kevyn Flores MD         A: MATTHEW/jose luis 2020 1:41 PM    T: JOSE LUIS 2020 12:41 PM          Assessment :  #1.  9 weeks status post weightbearing valgus injury left knee with known history of underlying mild osteoarthritis medial compartment with patellofemoral arthropathy with MRI documented healed nondisplaced/nondepressed lateral tibial plateau fracture   Impression:  Encounter Diagnoses   Name Primary?  Closed fracture of lateral portion of left tibial plateau with routine healing, subsequent encounter Yes    Closed fracture of lateral portion of left tibial plateau, initial encounter     Primary osteoarthritis of left knee     Old tear of medial meniscus of left knee, unspecified tear type        Office Procedures:  No orders of the defined types were placed in this encounter. Treatment Plan:  Treatment options were discussed with Jeramy De Leon. We did review his most recent plain films recent left knee MRI and exam findings. He is now 9 weeks out from his valgus rotational injury to his left knee. As noted he does have underlying patellofemoral arthropathy with mild medial compartment osteoarthritis as well as a previously known chronic complex trizonal tear posterior horn body medial meniscus but his MRI does show evidence of a nondepressed lateral tibial plateau fracture clearly present on his MRI. He is clinically doing well at this time and is approaching his baseline. He is performing most of his normal work duties but I would like for him to go back to physical therapy as I would like for him to have a functional advancement on the alter G prior to returning to his refereeing or umpiring. He may continue with his diclofenac 75 mg 1 pill twice daily for the next several weeks and then may discontinue this. He may utilize his brace for at risk activities. With his improvement I think we can see him back as needed but I did discuss his care with Joseph Rom his therapist who will advance him functionally. We can see him back as needed but he was encouraged to contact us with questions or concerns. This dictation was performed with a verbal recognition program (DRAGON) and it was checked for errors.  It is possible that there are still dictated errors within this office note. If so, please bring any errors to my attention for an addendum. All efforts were made to ensure that this office note is accurate.

## 2020-11-24 ENCOUNTER — APPOINTMENT (OUTPATIENT)
Dept: PHYSICAL THERAPY | Age: 50
End: 2020-11-24
Payer: COMMERCIAL

## 2020-12-14 ENCOUNTER — HOSPITAL ENCOUNTER (OUTPATIENT)
Dept: PHYSICAL THERAPY | Age: 50
Setting detail: THERAPIES SERIES
Discharge: HOME | End: 2020-12-14
Payer: COMMERCIAL

## 2020-12-14 NOTE — FLOWSHEET NOTE
[address]       Physical Therapy   Phone: [phone]   Fax: [phone]      Physical Therapy  Cancellation/No-show Note  Patient Name:  Terra Poe  :  1970   Date:  2020  Cancelled visits to date: 1  No-shows to date: 0    For today's appointment patient:  [x]  Cancelled  []  Rescheduled appointment  []  No-show     Reason given by patient:  []  Patient ill  []  Conflicting appointment  []  No transportation    [x]  Conflict with work- has to work  []  No reason given  []  Other:     Comments:      Electronically signed by:  Gerardo Anguiano PT

## 2022-01-14 DIAGNOSIS — M22.42 CHONDROMALACIA PATELLAE OF LEFT KNEE: ICD-10-CM

## 2022-01-14 DIAGNOSIS — S80.02XA CONTUSION OF LEFT KNEE, INITIAL ENCOUNTER: ICD-10-CM

## 2022-01-14 DIAGNOSIS — M17.12 PRIMARY OSTEOARTHRITIS OF LEFT KNEE: ICD-10-CM

## 2022-01-14 DIAGNOSIS — M25.562 LEFT KNEE PAIN, UNSPECIFIED CHRONICITY: ICD-10-CM

## 2022-01-17 RX ORDER — DICLOFENAC SODIUM 75 MG/1
75 TABLET, DELAYED RELEASE ORAL 2 TIMES DAILY
Qty: 60 TABLET | Refills: 3 | Status: SHIPPED | OUTPATIENT
Start: 2022-01-17

## 2022-02-15 NOTE — PROGRESS NOTES
Physical Therapy  Initial Assessment  Date: 2019  Patient Name: Charmaine Claudio  MRN: [id number]  : 1970     Treatment Diagnosis: Decreased functional mobility    Restrictions  Restrictions/Precautions  Restrictions/Precautions: Fall Risk(none)    Subjective   General  Chart Reviewed: Yes  Referring Practitioner: Dr. Jw Rose  Referral Date : 19  Diagnosis: R foot pain (M79.671); plantar fasciitis of R foot (M72.2); Pes planus of both feet (M21.41, M21.42)  PT Visit Information  Onset Date: 19  PT Insurance Information: [employer]  Total # of Visits Approved: 8  Total # of Visits to Date: 1  Subjective  Subjective: Pt stated he started to have R foot pain on 19. Pt had on/off pain since 19. 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 Pt saw Dr. Charlotte Hurst on 3/10/19. Pt was given a steroid pack and antiinflammatory and instructed to begin PT. Pt started PT late because \"baseball season started. \"  Pt rated R foot pain 0-3/10, but could spike after an activity (officiating) up to 5/10. Pt was instructed in a couple stretches previously. At home pt has pain with grass cutting or weed eating and stair climbing. 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 Pt wears \"heavier shoes\" as \"the whole shoe is just a big piece of armour- they're not spikes. \"   Pt has been wearing inserts for his \"flat arches\" which helps with pain. Pt has early morning foot stiffness. Objective  Posture: L LE ER, otherwise WFL. Gait: L LE ER. Stair climbing: L LE ER, otherwise WFL. Heel walk: (-); toe walk (-).     TTP: R plantar fascia  AROM RLE (degrees)  R Ankle Dorsiflexion 0-20: (Gastrocs) R 2, L 8.  R Ankle Forefoot Inversion 0-40: 40, L 35  R Ankle Forefoot Eversion 0-20: R 12, L 27  Joint Mobility  ROM RLE: Calcaneus PROM INV R 20, L 25; EV R 2, L 8.  Strength RLE  Strength RLE: [facility] LLE  Strength LLE: WFL  HEP: stretches: belt gastroc stretch, manual plantar fascia stretch, gastroc step stretch; towel toe curls. Assessment   Conditions Requiring Skilled Therapeutic Intervention  Body structures, Functions, Activity limitations: Decreased functional mobility ; Decreased ADL status; Decreased ROM; Decreased strength; Increased Pain  Assessment: Pt presented to PT with R foot pain and dysfunction. PLOF: independent and active. Treatment Diagnosis: Decreased functional mobility  Prognosis: Good  Decision Making: Low Complexity  REQUIRES PT FOLLOW UP: Yes  Activity Tolerance  Activity Tolerance: Patient Tolerated treatment well         Plan   Plan  Times per week: PT 1-2x/week for 4 weeks  Current Treatment Recommendations: Strengthening, ROM, Home Exercise Program, Modalities, Manual Therapy - Soft Tissue Mobilization, Manual Therapy - Joint Manipulation    OutComes Score  LEFS Total Score: 74    Goals  Short term goals  Time Frame for Short term goals: 1 week  Short term goal 1: Pt will be independent with HEP. Long term goals  Time Frame for Long term goals : 4 weeks  Long term goal 1: Pt will rate R foot pain 0-3/10. Long term goal 2: Pt will be able to run while officiating without foot pain. 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 Long term goal 4: DF AROM R ankle 10 degrees.   Patient Goals   Patient goals : \"Pain relief\", \"Go back to full function without pain\"       Therapy Time   Individual Concurrent Group Co-treatment   Time In 1631         Time Out 0930         Minutes 55         Timed Code Treatment Minutes: [address], PT no

## 2024-04-09 ENCOUNTER — OFFICE VISIT (OUTPATIENT)
Dept: ORTHOPEDIC SURGERY | Age: 54
End: 2024-04-09
Payer: COMMERCIAL

## 2024-04-09 VITALS — HEIGHT: 70 IN | BODY MASS INDEX: 35.79 KG/M2 | WEIGHT: 250 LBS

## 2024-04-09 DIAGNOSIS — M22.42 CHONDROMALACIA PATELLAE OF LEFT KNEE: ICD-10-CM

## 2024-04-09 DIAGNOSIS — M76.52 PATELLAR TENDONITIS OF LEFT KNEE: ICD-10-CM

## 2024-04-09 DIAGNOSIS — M17.12 PRIMARY OSTEOARTHRITIS OF LEFT KNEE: ICD-10-CM

## 2024-04-09 DIAGNOSIS — M25.562 ACUTE PAIN OF LEFT KNEE: Primary | ICD-10-CM

## 2024-04-09 PROCEDURE — G8417 CALC BMI ABV UP PARAM F/U: HCPCS | Performed by: FAMILY MEDICINE

## 2024-04-09 PROCEDURE — 3017F COLORECTAL CA SCREEN DOC REV: CPT | Performed by: FAMILY MEDICINE

## 2024-04-09 PROCEDURE — G8427 DOCREV CUR MEDS BY ELIG CLIN: HCPCS | Performed by: FAMILY MEDICINE

## 2024-04-09 PROCEDURE — 1036F TOBACCO NON-USER: CPT | Performed by: FAMILY MEDICINE

## 2024-04-09 PROCEDURE — 99203 OFFICE O/P NEW LOW 30 MIN: CPT | Performed by: FAMILY MEDICINE

## 2024-04-09 RX ORDER — METHYLPREDNISOLONE 4 MG/1
TABLET ORAL
Qty: 21 KIT | Refills: 0 | Status: SHIPPED | OUTPATIENT
Start: 2024-04-09

## 2024-04-09 RX ORDER — MELOXICAM 15 MG/1
15 TABLET ORAL DAILY
Qty: 30 TABLET | Refills: 3 | Status: SHIPPED | OUTPATIENT
Start: 2024-04-09

## 2024-04-09 RX ORDER — TESTOSTERONE 10 MG/G
GEL TOPICAL
COMMUNITY

## 2024-04-09 RX ORDER — ATORVASTATIN CALCIUM 10 MG/1
10 TABLET, FILM COATED ORAL DAILY
COMMUNITY

## 2024-04-09 RX ORDER — VALSARTAN AND HYDROCHLOROTHIAZIDE 160; 12.5 MG/1; MG/1
1 TABLET, FILM COATED ORAL DAILY
COMMUNITY

## 2024-04-09 NOTE — PROGRESS NOTES
RESIDENT/ATTENDING ATTESTATION:    After medical student / resident evaluation / PA evaluation and exam, I independently gathered patients history, independently did a physical, and agree with A/P as written in medical student's note (other than clarified below).      Please see below for additional information documented by the resident/attending including the A/P.  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 Steven Colindres with pmh of left lateral tibial plateau fracture in 2020 and underlying mild the medial and anterior compartment osteoarthritis who presents with left knee pain. Patient states that pain began approximately one week ago. He states he noticed it while he was doing yard work but does not recall a specific injury. He first thought pain was due to changing weather, but has continued to be a problem with changing positions. He states that his pain feels fine at rest. He rates the pain as 4 to 5 out of 10. He has taken a Tylenol for the pain but does not like to routinely use medications. He denies any knee pain prior to the last week.  He does locate the joint of his pain anteriorly and does seem to be more over the infrapatellar tendon than the actual retropatellar joint.  He has not had locking catching or true instability symptoms and has been taking occasional Tylenol and admits he has been a little lax and performing his exercise and stretching program.  He is being seen today for orthopedic and sports consultation with updated weightbearing imaging.      Pain Assessment  Location of Pain: Knee  Location Modifiers: Left  Severity of Pain: 4  Quality of Pain: Aching, Dull  Duration of Pain: Persistent  Frequency of Pain: Constant  Aggravating Factors: Standing, Walking, Squatting, Exercise  Limiting Behavior:

## 2024-05-01 ENCOUNTER — OFFICE VISIT (OUTPATIENT)
Dept: ORTHOPEDIC SURGERY | Age: 54
End: 2024-05-01
Payer: COMMERCIAL

## 2024-05-01 DIAGNOSIS — M76.32 ILIOTIBIAL BAND SYNDROME OF LEFT SIDE: ICD-10-CM

## 2024-05-01 DIAGNOSIS — M22.42 CHONDROMALACIA PATELLAE OF LEFT KNEE: ICD-10-CM

## 2024-05-01 DIAGNOSIS — M25.562 ACUTE PAIN OF LEFT KNEE: ICD-10-CM

## 2024-05-01 DIAGNOSIS — M17.12 PRIMARY OSTEOARTHRITIS OF LEFT KNEE: Primary | ICD-10-CM

## 2024-05-01 PROCEDURE — 3017F COLORECTAL CA SCREEN DOC REV: CPT | Performed by: FAMILY MEDICINE

## 2024-05-01 PROCEDURE — 99214 OFFICE O/P EST MOD 30 MIN: CPT | Performed by: FAMILY MEDICINE

## 2024-05-01 PROCEDURE — G8417 CALC BMI ABV UP PARAM F/U: HCPCS | Performed by: FAMILY MEDICINE

## 2024-05-01 PROCEDURE — G8428 CUR MEDS NOT DOCUMENT: HCPCS | Performed by: FAMILY MEDICINE

## 2024-05-01 PROCEDURE — 1036F TOBACCO NON-USER: CPT | Performed by: FAMILY MEDICINE

## 2024-05-01 RX ORDER — METHYLPREDNISOLONE 4 MG/1
TABLET ORAL
Qty: 21 KIT | Refills: 0 | Status: SHIPPED | OUTPATIENT
Start: 2024-05-01

## 2024-05-01 NOTE — PROGRESS NOTES
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 Steven Colindres with pmh of left lateral tibial plateau fracture in 2020 and underlying mild the medial and anterior compartment osteoarthritis who presents with left knee pain. Patient states that pain began approximately one week ago. He states he noticed it while he was doing yard work but does not recall a specific injury. He first thought pain was due to changing weather, but has continued to be a problem with changing positions. He states that his pain feels fine at rest. He rates the pain as 4 to 5 out of 10. He has taken a Tylenol for the pain but does not like to routinely use medications. He denies any knee pain prior to the last week.  He does locate the joint of his pain anteriorly and does seem to be more over the infrapatellar tendon than the actual retropatellar joint.  He has not had locking catching or true instability symptoms and has been taking occasional Tylenol and admits he has been a little lax and performing his exercise and stretching program.  He is being seen today for orthopedic and sports consultation with updated weightbearing imaging.    We last saw Eddie in the office on 4/9/2024 for a follow-up on his left knee patellar tendinitis with primary osteoarthritis and chondromalacia patella.  Did have remote history of treatment for a subchondral fracture to the lateral tibial plateau back in 2020.  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  He was doing very well up until roughly 4/29/2024 when he rushed out of the house to check on their small chickens as the automatic door had opened and he was concerned about them getting out.

## 2024-08-21 ENCOUNTER — OFFICE VISIT (OUTPATIENT)
Dept: ORTHOPEDIC SURGERY | Age: 54
End: 2024-08-21
Payer: COMMERCIAL

## 2024-08-21 ENCOUNTER — TELEPHONE (OUTPATIENT)
Dept: ORTHOPEDIC SURGERY | Age: 54
End: 2024-08-21

## 2024-08-21 VITALS — WEIGHT: 250 LBS | BODY MASS INDEX: 35.79 KG/M2 | HEIGHT: 70 IN

## 2024-08-21 DIAGNOSIS — M17.12 PRIMARY OSTEOARTHRITIS OF LEFT KNEE: Primary | ICD-10-CM

## 2024-08-21 DIAGNOSIS — M76.32 ILIOTIBIAL BAND SYNDROME OF LEFT SIDE: ICD-10-CM

## 2024-08-21 DIAGNOSIS — M22.42 CHONDROMALACIA PATELLAE OF LEFT KNEE: ICD-10-CM

## 2024-08-21 DIAGNOSIS — M65.9 SYNOVITIS OF LEFT KNEE: ICD-10-CM

## 2024-08-21 DIAGNOSIS — M76.52 PATELLAR TENDONITIS OF LEFT KNEE: ICD-10-CM

## 2024-08-21 DIAGNOSIS — M25.562 ACUTE PAIN OF LEFT KNEE: ICD-10-CM

## 2024-08-21 PROCEDURE — 99214 OFFICE O/P EST MOD 30 MIN: CPT | Performed by: FAMILY MEDICINE

## 2024-08-21 PROCEDURE — 3017F COLORECTAL CA SCREEN DOC REV: CPT | Performed by: FAMILY MEDICINE

## 2024-08-21 PROCEDURE — G8417 CALC BMI ABV UP PARAM F/U: HCPCS | Performed by: FAMILY MEDICINE

## 2024-08-21 PROCEDURE — 1036F TOBACCO NON-USER: CPT | Performed by: FAMILY MEDICINE

## 2024-08-21 PROCEDURE — G8427 DOCREV CUR MEDS BY ELIG CLIN: HCPCS | Performed by: FAMILY MEDICINE

## 2024-08-21 RX ORDER — MELOXICAM 15 MG/1
15 TABLET ORAL DAILY PRN
Qty: 30 TABLET | Refills: 3 | Status: SHIPPED | OUTPATIENT
Start: 2024-08-21

## 2024-08-21 NOTE — PATIENT INSTRUCTIONS
If you're currently taking an anti-inflammatory such as advil, aleve, ibuprofen, diclofenac, naproxen, meloxicam, celebrex, or nabumetone, please stop.    Take Medrol first for 6 days. This is a steroid pack. Flip the package over to the foil side and the directions will tell you to start with 6 pills the first day, 5 pills the second day, etc. Please do not take any other anti-inflammatories with the medrol dose timothy as this can upset your stomach. If something else is needed, you may take extra strength tylenol.     Once you are finished with the medrol, then you may re-start or start your anti-inflammatory: Meloxicam

## 2024-08-21 NOTE — PROGRESS NOTES
pain.  He continues to deny locking catching or true instability symptoms.      Medical History     Patient's medications, allergies, past medical, surgical, social and family histories were reviewed and updated as appropriate.    Review of Systems  Pertinent items are noted in HPI and noted on history form 4/9/2024      Vital Signs  There were no vitals filed for this visit.    General Exam:     Constitutional: Patient is adequately groomed with no evidence of malnutrition  DTRs: Deep tendon reflexes are intact  Mental Status: The patient is oriented to time, place and person. The patient's mood and affect are appropriate.  Lymphatic: The lymphatic examination bilaterally reveals all areas to be without enlargement or induration.  Vascular: Examination reveals no swelling or calf tenderness.  Peripheral pulses are palpable and 2+.  Neurological: The patient has good coordination.  There is no weakness or sensory deficit.    Knee Examination  Inspection:  No swelling or erythema.  There is no evidence of deformity or tense effusion although he does have some patellofemoral crepitation.  He does appear to have more fullness in the popliteal fossa likely consistent with a Baker's cyst.    Palpation: No further tenderness to palpation over patellar tendon which he rates rates at 0 out of 10.  There is only very minimal tenderness over the retropatellar joint and with palpation of the facet joints medially and laterally.  He does seem to be having more tenderness to the anterior posterior thirds of the medial joint line worsening with Dameon's however no appreciable click is noted he is no longer tender over the lateral IT band.     Range of Motion:  Normal range of motion.  Hamstrings mildly tight.    Strength:  5/5 strength    Special Tests: Only mild discomfort patellar grind testing some pain with medial Dameon's no high-grade clicks.  No evidence of high-grade instability    Skin: There are no rashes,

## 2024-08-21 NOTE — TELEPHONE ENCOUNTER
Left voicemail for patient that their MRI has been authorized and that they can call and schedule scan at their convenience. Also told them that they can call and schedule a f/u with Dr. Montanez once they have MRI scheduled, leaving at least 2-3 days for our office to receive their results.

## 2024-09-10 ENCOUNTER — OFFICE VISIT (OUTPATIENT)
Dept: ORTHOPEDIC SURGERY | Age: 54
End: 2024-09-10
Payer: COMMERCIAL

## 2024-09-10 DIAGNOSIS — M23.204 OLD COMPLEX TEAR OF MEDIAL MENISCUS OF LEFT KNEE: ICD-10-CM

## 2024-09-10 DIAGNOSIS — M22.42 CHONDROMALACIA PATELLAE OF LEFT KNEE: ICD-10-CM

## 2024-09-10 DIAGNOSIS — M25.562 ACUTE PAIN OF LEFT KNEE: ICD-10-CM

## 2024-09-10 DIAGNOSIS — M17.12 PRIMARY OSTEOARTHRITIS OF LEFT KNEE: Primary | ICD-10-CM

## 2024-09-10 PROCEDURE — 3017F COLORECTAL CA SCREEN DOC REV: CPT | Performed by: FAMILY MEDICINE

## 2024-09-10 PROCEDURE — 20610 DRAIN/INJ JOINT/BURSA W/O US: CPT | Performed by: FAMILY MEDICINE

## 2024-09-10 PROCEDURE — 99213 OFFICE O/P EST LOW 20 MIN: CPT | Performed by: FAMILY MEDICINE

## 2024-09-10 PROCEDURE — G8427 DOCREV CUR MEDS BY ELIG CLIN: HCPCS | Performed by: FAMILY MEDICINE

## 2024-09-10 PROCEDURE — 1036F TOBACCO NON-USER: CPT | Performed by: FAMILY MEDICINE

## 2024-09-10 PROCEDURE — G8417 CALC BMI ABV UP PARAM F/U: HCPCS | Performed by: FAMILY MEDICINE

## 2024-09-10 RX ORDER — BETAMETHASONE SODIUM PHOSPHATE AND BETAMETHASONE ACETATE 3; 3 MG/ML; MG/ML
12 INJECTION, SUSPENSION INTRA-ARTICULAR; INTRALESIONAL; INTRAMUSCULAR; SOFT TISSUE ONCE
Status: COMPLETED | OUTPATIENT
Start: 2024-09-10 | End: 2024-09-10

## 2024-09-10 RX ORDER — LIDOCAINE HYDROCHLORIDE 10 MG/ML
1 INJECTION, SOLUTION INFILTRATION; PERINEURAL ONCE
Status: COMPLETED | OUTPATIENT
Start: 2024-09-10 | End: 2024-09-10

## 2024-09-10 RX ORDER — BUPIVACAINE HYDROCHLORIDE 2.5 MG/ML
2 INJECTION, SOLUTION INFILTRATION; PERINEURAL ONCE
Status: COMPLETED | OUTPATIENT
Start: 2024-09-10 | End: 2024-09-10

## 2024-09-10 RX ADMIN — BETAMETHASONE SODIUM PHOSPHATE AND BETAMETHASONE ACETATE 12 MG: 3; 3 INJECTION, SUSPENSION INTRA-ARTICULAR; INTRALESIONAL; INTRAMUSCULAR; SOFT TISSUE at 14:58

## 2024-09-10 RX ADMIN — LIDOCAINE HYDROCHLORIDE 1 ML: 10 INJECTION, SOLUTION INFILTRATION; PERINEURAL at 14:59

## 2024-09-10 RX ADMIN — BUPIVACAINE HYDROCHLORIDE 5 MG: 2.5 INJECTION, SOLUTION INFILTRATION; PERINEURAL at 14:58

## 2024-09-13 ENCOUNTER — TELEPHONE (OUTPATIENT)
Dept: ORTHOPEDIC SURGERY | Age: 54
End: 2024-09-13

## 2025-06-11 DIAGNOSIS — M17.12 PRIMARY OSTEOARTHRITIS OF LEFT KNEE: ICD-10-CM

## 2025-06-11 DIAGNOSIS — M76.52 PATELLAR TENDONITIS OF LEFT KNEE: ICD-10-CM

## 2025-06-11 DIAGNOSIS — M22.42 CHONDROMALACIA PATELLAE OF LEFT KNEE: ICD-10-CM

## 2025-06-11 DIAGNOSIS — M76.32 ILIOTIBIAL BAND SYNDROME OF LEFT SIDE: ICD-10-CM

## 2025-06-11 DIAGNOSIS — M65.962 SYNOVITIS OF LEFT KNEE: ICD-10-CM

## 2025-06-11 DIAGNOSIS — M25.562 ACUTE PAIN OF LEFT KNEE: ICD-10-CM

## 2025-06-12 RX ORDER — MELOXICAM 15 MG/1
15 TABLET ORAL DAILY PRN
Qty: 30 TABLET | Refills: 3 | Status: SHIPPED | OUTPATIENT
Start: 2025-06-12